# Patient Record
Sex: MALE | Race: OTHER | Employment: OTHER | URBAN - NONMETROPOLITAN AREA
[De-identification: names, ages, dates, MRNs, and addresses within clinical notes are randomized per-mention and may not be internally consistent; named-entity substitution may affect disease eponyms.]

---

## 2018-10-13 ENCOUNTER — HOSPITAL ENCOUNTER (EMERGENCY)
Age: 68
Discharge: HOME | End: 2018-10-13
Attending: FAMILY MEDICINE
Payer: MEDICARE

## 2018-10-13 VITALS
SYSTOLIC BLOOD PRESSURE: 164 MMHG | WEIGHT: 180 LBS | TEMPERATURE: 97.8 F | RESPIRATION RATE: 16 BRPM | DIASTOLIC BLOOD PRESSURE: 78 MMHG | HEIGHT: 65 IN | OXYGEN SATURATION: 98 % | HEART RATE: 98 BPM | BODY MASS INDEX: 29.99 KG/M2

## 2018-10-13 DIAGNOSIS — R33.8 POSTOPERATIVE URINARY RETENTION: Primary | ICD-10-CM

## 2018-10-13 DIAGNOSIS — N99.89 POSTOPERATIVE URINARY RETENTION: Primary | ICD-10-CM

## 2018-10-13 LAB
BACTERIA: NEGATIVE /HPF
BILIRUBIN URINE: NEGATIVE
BLOOD, URINE: ABNORMAL
CLARITY: ABNORMAL
COLOR: YELLOW
EPITHELIAL CELLS, UA: 0 /HPF (ref 0–5)
GLUCOSE URINE: 500 MG/DL
HYALINE CASTS: 6 /HPF (ref 0–8)
KETONES, URINE: NEGATIVE MG/DL
LEUKOCYTE ESTERASE, URINE: ABNORMAL
NITRITE, URINE: NEGATIVE
PH UA: 5.5
PROTEIN UA: 100 MG/DL
RBC UA: 37 /HPF (ref 0–4)
SPECIFIC GRAVITY UA: 1.02
URINE REFLEX TO CULTURE: YES
UROBILINOGEN, URINE: 0.2 E.U./DL
WBC UA: 11 /HPF (ref 0–5)

## 2018-10-13 PROCEDURE — 87086 URINE CULTURE/COLONY COUNT: CPT

## 2018-10-13 PROCEDURE — 99284 EMERGENCY DEPT VISIT MOD MDM: CPT | Performed by: FAMILY MEDICINE

## 2018-10-13 PROCEDURE — 81001 URINALYSIS AUTO W/SCOPE: CPT

## 2018-10-13 PROCEDURE — 99283 EMERGENCY DEPT VISIT LOW MDM: CPT

## 2018-10-13 PROCEDURE — 6370000000 HC RX 637 (ALT 250 FOR IP): Performed by: FAMILY MEDICINE

## 2018-10-13 RX ORDER — UBIDECARENONE 60 MG
30 CAPSULE ORAL
COMMUNITY

## 2018-10-13 RX ORDER — DOXAZOSIN 2 MG/1
2 TABLET ORAL 2 TIMES DAILY
COMMUNITY

## 2018-10-13 RX ORDER — MAG HYDROX/ALUMINUM HYD/SIMETH 400-400-40
SUSPENSION, ORAL (FINAL DOSE FORM) ORAL
COMMUNITY

## 2018-10-13 RX ORDER — GLIMEPIRIDE 4 MG/1
4 TABLET ORAL 2 TIMES DAILY
COMMUNITY

## 2018-10-13 RX ORDER — LISINOPRIL 20 MG/1
20 TABLET ORAL 2 TIMES DAILY
COMMUNITY

## 2018-10-13 RX ORDER — OXYCODONE HYDROCHLORIDE AND ACETAMINOPHEN 5; 325 MG/1; MG/1
1 TABLET ORAL EVERY 6 HOURS PRN
COMMUNITY

## 2018-10-13 RX ADMIN — LIDOCAINE HYDROCHLORIDE: 20 JELLY TOPICAL at 04:10

## 2018-10-13 ASSESSMENT — ENCOUNTER SYMPTOMS
COLOR CHANGE: 0
WHEEZING: 0
DIARRHEA: 0
NAUSEA: 0
VOMITING: 0
COUGH: 0
ABDOMINAL PAIN: 0
SHORTNESS OF BREATH: 0
BACK PAIN: 0
SORE THROAT: 0

## 2018-10-13 NOTE — ED NOTES
PT has healing surgical scars and bruising over abd from recent laproscopic surgery. No swelling, drainage or signs of infection noted.       Tima Sterling RN  10/13/18 [id number]

## 2018-10-13 NOTE — ED PROVIDER NOTES
and oriented to person, place, and time. Psychiatric: He has a normal mood and affect. DIAGNOSTIC RESULTS     EKG: All EKG's areinterpreted by the Emergency Department Physician who either signs or Co-signs this chart in the absence of a cardiologist.        RADIOLOGY:  Non-plain film images such as CT, Ultrasound and MRI are read by the radiologist. Plain radiographic images are visualized and preliminarily interpreted bythe emergency physician with the below findings:          No orders to display           LABS:  Labs Reviewed   URINE RT REFLEX TO CULTURE       All other labs were within normal range or not returned as of this dictation. EMERGENCY DEPARTMENT COURSE and DIFFERENTIAL DIAGNOSIS/MDM:   Vitals:    Vitals:    10/13/18 0330   BP: (!) 189/102   Pulse: 109   Resp: 18   Temp: 97.7 °F (36.5 °C)   TempSrc: Oral   SpO2: 97%   Weight: 180 lb (81.6 kg)   Height: 5' 5\" (1.651 m)       MDM    Reassessment  Clifford in place 4:17 a.m. states he feels better we will discharge to the house follow-up with his urologist    CONSULTS:  None    PROCEDURES:  Unless otherwise noted below, none     Procedures    FINAL IMPRESSION      1. Postoperative urinary retention          DISPOSITION/PLAN   DISPOSITION Decision To Discharge 10/13/2018 04:17:01 AM      PATIENT REFERRED TO:  No follow-up provider specified.     DISCHARGE MEDICATIONS:  Current Discharge Medication List             (Please note that portions of this note were completed with a voice recognition program.  Efforts were made to edit thedictations but occasionally words are mis-transcribed.)    Bella Gupta MD (electronically signed)  Attending Emergency Physician          Carol Barber MD  10/13/18 [id number]

## 2018-10-15 LAB — URINE CULTURE, ROUTINE: NORMAL

## 2018-10-20 ENCOUNTER — HOSPITAL ENCOUNTER (EMERGENCY)
Age: 68
Discharge: HOME | End: 2018-10-20
Payer: MEDICARE

## 2018-10-20 VITALS
DIASTOLIC BLOOD PRESSURE: 102 MMHG | TEMPERATURE: 98.2 F | BODY MASS INDEX: 29.99 KG/M2 | SYSTOLIC BLOOD PRESSURE: 182 MMHG | HEART RATE: 109 BPM | HEIGHT: 65 IN | RESPIRATION RATE: 16 BRPM | OXYGEN SATURATION: 95 % | WEIGHT: 180 LBS

## 2018-10-20 DIAGNOSIS — R33.8 POSTOPERATIVE URINARY RETENTION: Primary | ICD-10-CM

## 2018-10-20 DIAGNOSIS — N99.89 POSTOPERATIVE URINARY RETENTION: Primary | ICD-10-CM

## 2018-10-20 LAB
BACTERIA: NEGATIVE /HPF
BILIRUBIN URINE: NEGATIVE
BLOOD, URINE: ABNORMAL
CLARITY: CLEAR
COLOR: YELLOW
EPITHELIAL CELLS, UA: 0 /HPF (ref 0–5)
GLUCOSE URINE: NEGATIVE MG/DL
HYALINE CASTS: 0 /HPF (ref 0–8)
KETONES, URINE: NEGATIVE MG/DL
LEUKOCYTE ESTERASE, URINE: ABNORMAL
NITRITE, URINE: NEGATIVE
PH UA: 6
PROTEIN UA: ABNORMAL MG/DL
RBC UA: 28 /HPF (ref 0–4)
SPECIFIC GRAVITY UA: 1.01
URINE REFLEX TO CULTURE: YES
UROBILINOGEN, URINE: 0.2 E.U./DL
WBC UA: 3 /HPF (ref 0–5)

## 2018-10-20 PROCEDURE — 6370000000 HC RX 637 (ALT 250 FOR IP): Performed by: NURSE PRACTITIONER

## 2018-10-20 PROCEDURE — 81001 URINALYSIS AUTO W/SCOPE: CPT

## 2018-10-20 PROCEDURE — 99283 EMERGENCY DEPT VISIT LOW MDM: CPT

## 2018-10-20 PROCEDURE — 99284 EMERGENCY DEPT VISIT MOD MDM: CPT | Performed by: NURSE PRACTITIONER

## 2018-10-20 PROCEDURE — 87086 URINE CULTURE/COLONY COUNT: CPT

## 2018-10-20 PROCEDURE — 51702 INSERT TEMP BLADDER CATH: CPT

## 2018-10-20 RX ADMIN — LIDOCAINE HYDROCHLORIDE: 20 JELLY TOPICAL at 09:20

## 2018-10-20 ASSESSMENT — ENCOUNTER SYMPTOMS: ABDOMINAL PAIN: 1

## 2018-10-22 LAB — URINE CULTURE, ROUTINE: NORMAL

## 2023-04-30 ENCOUNTER — HOSPITAL ENCOUNTER (EMERGENCY)
Age: 73
Discharge: HOME | End: 2023-04-30
Payer: MEDICARE

## 2023-04-30 VITALS
BODY MASS INDEX: 26.49 KG/M2 | HEIGHT: 65 IN | WEIGHT: 159 LBS | RESPIRATION RATE: 17 BRPM | OXYGEN SATURATION: 98 % | SYSTOLIC BLOOD PRESSURE: 157 MMHG | HEART RATE: 71 BPM | TEMPERATURE: 98.3 F | DIASTOLIC BLOOD PRESSURE: 72 MMHG

## 2023-04-30 DIAGNOSIS — S05.01XA ABRASION OF RIGHT CORNEA, INITIAL ENCOUNTER: Primary | ICD-10-CM

## 2023-04-30 PROCEDURE — 6370000000 HC RX 637 (ALT 250 FOR IP): Performed by: NURSE PRACTITIONER

## 2023-04-30 PROCEDURE — 99283 EMERGENCY DEPT VISIT LOW MDM: CPT

## 2023-04-30 RX ORDER — ERYTHROMYCIN 5 MG/G
OINTMENT OPHTHALMIC ONCE
Status: COMPLETED | OUTPATIENT
Start: 2023-04-30 | End: 2023-04-30

## 2023-04-30 RX ORDER — TETRACAINE HYDROCHLORIDE 5 MG/ML
1 SOLUTION OPHTHALMIC ONCE
Status: COMPLETED | OUTPATIENT
Start: 2023-04-30 | End: 2023-04-30

## 2023-04-30 RX ADMIN — ERYTHROMYCIN: 5 OINTMENT OPHTHALMIC at 21:34

## 2023-04-30 RX ADMIN — FLUORESCEIN SODIUM 1 MG: 1 STRIP OPHTHALMIC at 20:46

## 2023-04-30 RX ADMIN — TETRACAINE HYDROCHLORIDE 1 DROP: 5 SOLUTION OPHTHALMIC at 20:46

## 2023-04-30 ASSESSMENT — PAIN DESCRIPTION - PAIN TYPE: TYPE: ACUTE PAIN

## 2023-04-30 ASSESSMENT — PAIN DESCRIPTION - LOCATION: LOCATION: EYE

## 2023-04-30 ASSESSMENT — ENCOUNTER SYMPTOMS
PHOTOPHOBIA: 1
EYE WATERING: 1
BLURRED VISION: 0
EYE PAIN: 1
EYE DISCHARGE: 1
EYE REDNESS: 1

## 2023-04-30 ASSESSMENT — VISUAL ACUITY: OD: 20/10

## 2023-04-30 ASSESSMENT — PAIN DESCRIPTION - ORIENTATION: ORIENTATION: RIGHT

## 2023-04-30 ASSESSMENT — PAIN SCALES - GENERAL: PAINLEVEL_OUTOF10: 3

## 2023-04-30 ASSESSMENT — PAIN DESCRIPTION - FREQUENCY: FREQUENCY: CONTINUOUS

## 2023-04-30 ASSESSMENT — PAIN DESCRIPTION - DESCRIPTORS: DESCRIPTORS: BURNING

## 2023-04-30 ASSESSMENT — PAIN - FUNCTIONAL ASSESSMENT: PAIN_FUNCTIONAL_ASSESSMENT: 0-10

## 2023-05-01 NOTE — ED PROVIDER NOTES
[facility]  eMERGENCY dEPARTMENT eNCOUnter      Pt Name: Bobby Ernst  MRN: [id number]  Velasquezgferika 1950  Date of evaluation: 4/30/2023  Provider: [address]       Chief Complaint   Patient presents with    Eye Pain     Pt reports feeling like something is in eye for past 3 days. No visible foreign object, altho right eye is tearing. HISTORY OF PRESENT ILLNESS   (Location/Symptom, Timing/Onset,Context/Setting, Quality, Duration, Modifying Factors, Severity)  Note limiting factors. Bobby Ernst is a 81 y.o. male who presents to the emergency department with something in his r eye. He was blowing grass with a blower and got something in it. Does not wear contacts. Has had no vision changes    The history is provided by the patient. Eye Problem  Location:  Right eye  Quality:  Aching, foreign body sensation and tearing  Severity:  Moderate  Onset quality:  Sudden  Duration:  3 days  Timing:  Constant  Progression:  Worsening  Chronicity:  New  Context: foreign body    Associated symptoms: discharge, photophobia, redness and tearing    Associated symptoms: no blurred vision      NursingNotes were reviewed. REVIEW OF SYSTEMS    (2-9 systems for level 4, 10 or more for level 5)     Review of Systems   Eyes:  Positive for photophobia, pain, discharge and redness. Negative for blurred vision. Except as noted above the remainder of the review of systems was reviewed and negative.        PAST MEDICAL HISTORY     Past Medical History:   Diagnosis Date    Cancer [facility])     prostate    Diabetes mellitus ([facility] [address]          SURGICALHISTORY       Past Surgical History:   Procedure Laterality Date    APPENDECTOMY      KNEE SURGERY Right     PROSTATE SURGERY  10/03/2018    TONSILLECTOMY AND ADENOIDECTOMY           CURRENT MEDICATIONS       Discharge Medication List as of 4/30/2023  9:50 PM        CONTINUE these medications which have NOT CHANGED    Details   oxyCODONE-acetaminophen

## 2023-12-15 ENCOUNTER — TELEPHONE (OUTPATIENT)
Dept: HEMATOLOGY | Age: 73
End: 2023-12-15

## 2023-12-15 NOTE — TELEPHONE ENCOUNTER
Called patient to remind them of their appointment on 12/18/23 but was unable to leave vm due to mailbox being full or not set up or memory is full.

## 2023-12-18 ENCOUNTER — HOSPITAL ENCOUNTER (OUTPATIENT)
Dept: INFUSION THERAPY | Age: 73
Discharge: HOME | End: 2023-12-18
Payer: MEDICARE

## 2023-12-18 DIAGNOSIS — C61 PROSTATE CANCER (HCC): ICD-10-CM

## 2023-12-18 LAB
ALBUMIN SERPL-MCNC: 4.3 G/DL (ref 3.5–5.2)
ALP SERPL-CCNC: 106 U/L (ref 40–130)
ALT SERPL-CCNC: 29 U/L (ref 21–72)
ANION GAP SERPL CALCULATED.3IONS-SCNC: 13 MMOL/L (ref 7–19)
AST SERPL-CCNC: 27 U/L (ref 17–59)
BASOPHILS # BLD: 0.03 K/UL (ref 0.01–0.08)
BASOPHILS NFR BLD: 0.4 % (ref 0.1–1.2)
BILIRUB SERPL-MCNC: 0.6 MG/DL (ref 0.2–1.3)
BUN SERPL-MCNC: 14 MG/DL (ref 9–20)
CALCIUM SERPL-MCNC: 9.6 MG/DL (ref 8.4–10.2)
CHLORIDE SERPL-SCNC: 101 MMOL/L (ref 98–111)
CO2 SERPL-SCNC: 24 MMOL/L (ref 22–29)
CREAT SERPL-MCNC: 0.8 MG/DL (ref 0.6–1.2)
EOSINOPHIL # BLD: 0.3 K/UL (ref 0.04–0.54)
EOSINOPHIL NFR BLD: 4 % (ref 0.7–7)
ERYTHROCYTE [DISTWIDTH] IN BLOOD BY AUTOMATED COUNT: 13 % (ref 11.6–14.4)
GLOBULIN: 3 G/DL
GLUCOSE SERPL-MCNC: 265 MG/DL (ref 74–106)
HCT VFR BLD AUTO: 42.2 % (ref 40.1–51)
HGB BLD-MCNC: 15.1 G/DL (ref 13.7–17.5)
LYMPHOCYTES # BLD: 1.36 K/UL (ref 1.18–3.74)
LYMPHOCYTES NFR BLD: 18.1 % (ref 19.3–53.1)
MCH RBC QN AUTO: 30 PG (ref 25.7–32.2)
MCHC RBC AUTO-ENTMCNC: 35.8 G/DL (ref 32.3–36.5)
MCV RBC AUTO: 83.9 FL (ref 79–92.2)
MONOCYTES # BLD: 0.52 K/UL (ref 0.24–0.82)
MONOCYTES NFR BLD: 6.9 % (ref 4.7–12.5)
NEUTROPHILS # BLD: 5.3 K/UL (ref 1.56–6.13)
NEUTS SEG NFR BLD: 70.3 % (ref 34–71.1)
PLATELET # BLD AUTO: 191 K/UL (ref 163–337)
PMV BLD AUTO: 9.6 FL (ref 7.4–10.4)
POTASSIUM SERPL-SCNC: 4.6 MMOL/L (ref 3.5–5.1)
PROT SERPL-MCNC: 7.3 G/DL (ref 6.3–8.2)
PSA SERPL-MCNC: <0.01 NG/ML (ref 0–4)
RBC # BLD AUTO: 5.03 M/UL (ref 4.63–6.08)
SODIUM SERPL-SCNC: 138 MMOL/L (ref 137–145)
WBC # BLD AUTO: 7.53 K/UL (ref 4.23–9.07)

## 2023-12-18 PROCEDURE — 80053 COMPREHEN METABOLIC PANEL: CPT

## 2023-12-18 PROCEDURE — 36415 COLL VENOUS BLD VENIPUNCTURE: CPT

## 2023-12-18 PROCEDURE — 99212 OFFICE O/P EST SF 10 MIN: CPT

## 2023-12-18 PROCEDURE — 85025 COMPLETE CBC W/AUTO DIFF WBC: CPT

## 2023-12-20 PROBLEM — C61 PROSTATE CANCER (HCC): Status: ACTIVE | Noted: 2023-12-20

## 2023-12-28 ENCOUNTER — TELEPHONE (OUTPATIENT)
Dept: HEMATOLOGY | Age: 73
End: 2023-12-28

## 2024-01-02 ENCOUNTER — CLINICAL DOCUMENTATION (OUTPATIENT)
Facility: HOSPITAL | Age: 74
End: 2024-01-02

## 2024-01-02 NOTE — PROGRESS NOTES
Reviewed pt's chart and there isn't assistance available right now for pt's treatment as of today. I am mailing out pt's health benefits with a financial assistance application with my contact information for any financial or billing concerns.

## 2024-01-10 ENCOUNTER — HOSPITAL ENCOUNTER (OUTPATIENT)
Dept: INFUSION THERAPY | Age: 74
Discharge: HOME | End: 2024-01-10
Payer: MEDICARE

## 2024-01-10 VITALS
TEMPERATURE: 97.3 F | SYSTOLIC BLOOD PRESSURE: 132 MMHG | BODY MASS INDEX: 25.33 KG/M2 | HEIGHT: 65 IN | OXYGEN SATURATION: 96 % | WEIGHT: 152 LBS | HEART RATE: 75 BPM | DIASTOLIC BLOOD PRESSURE: 74 MMHG

## 2024-01-10 DIAGNOSIS — C61 PROSTATE CANCER (HCC): Primary | ICD-10-CM

## 2024-01-10 LAB
ALBUMIN SERPL-MCNC: 4.3 G/DL (ref 3.5–5.2)
ALP SERPL-CCNC: 157 U/L (ref 40–130)
ALT SERPL-CCNC: 31 U/L (ref 21–72)
ANION GAP SERPL CALCULATED.3IONS-SCNC: 13 MMOL/L (ref 7–19)
AST SERPL-CCNC: 20 U/L (ref 17–59)
BASOPHILS # BLD: 0.04 K/UL (ref 0.01–0.08)
BASOPHILS NFR BLD: 0.6 % (ref 0.1–1.2)
BILIRUB SERPL-MCNC: 0.4 MG/DL (ref 0.2–1.3)
BUN SERPL-MCNC: 17 MG/DL (ref 9–20)
CALCIUM SERPL-MCNC: 9.8 MG/DL (ref 8.4–10.2)
CHLORIDE SERPL-SCNC: 98 MMOL/L (ref 98–111)
CO2 SERPL-SCNC: 26 MMOL/L (ref 22–29)
CREAT SERPL-MCNC: 0.8 MG/DL (ref 0.6–1.2)
EOSINOPHIL # BLD: 0.27 K/UL (ref 0.04–0.54)
EOSINOPHIL NFR BLD: 3.9 % (ref 0.7–7)
ERYTHROCYTE [DISTWIDTH] IN BLOOD BY AUTOMATED COUNT: 13 % (ref 11.6–14.4)
GLOBULIN: 3 G/DL
GLUCOSE SERPL-MCNC: 389 MG/DL (ref 74–106)
HCT VFR BLD AUTO: 42.2 % (ref 40.1–51)
HGB BLD-MCNC: 14.9 G/DL (ref 13.7–17.5)
LYMPHOCYTES # BLD: 1.24 K/UL (ref 1.18–3.74)
LYMPHOCYTES NFR BLD: 17.9 % (ref 19.3–53.1)
MCH RBC QN AUTO: 30 PG (ref 25.7–32.2)
MCHC RBC AUTO-ENTMCNC: 35.3 G/DL (ref 32.3–36.5)
MCV RBC AUTO: 84.9 FL (ref 79–92.2)
MONOCYTES # BLD: 0.45 K/UL (ref 0.24–0.82)
MONOCYTES NFR BLD: 6.5 % (ref 4.7–12.5)
NEUTROPHILS # BLD: 4.91 K/UL (ref 1.56–6.13)
NEUTS SEG NFR BLD: 70.8 % (ref 34–71.1)
PLATELET # BLD AUTO: 197 K/UL (ref 163–337)
PMV BLD AUTO: 9.7 FL (ref 7.4–10.4)
POTASSIUM SERPL-SCNC: 4.9 MMOL/L (ref 3.5–5.1)
PROT SERPL-MCNC: 7.3 G/DL (ref 6.3–8.2)
RBC # BLD AUTO: 4.97 M/UL (ref 4.63–6.08)
SODIUM SERPL-SCNC: 137 MMOL/L (ref 137–145)
WBC # BLD AUTO: 6.93 K/UL (ref 4.23–9.07)

## 2024-01-10 PROCEDURE — 6360000002 HC RX W HCPCS: Performed by: INTERNAL MEDICINE

## 2024-01-10 PROCEDURE — 36415 COLL VENOUS BLD VENIPUNCTURE: CPT

## 2024-01-10 PROCEDURE — 80053 COMPREHEN METABOLIC PANEL: CPT

## 2024-01-10 PROCEDURE — 85025 COMPLETE CBC W/AUTO DIFF WBC: CPT

## 2024-01-10 PROCEDURE — 96402 CHEMO HORMON ANTINEOPL SQ/IM: CPT

## 2024-01-10 RX ORDER — SODIUM CHLORIDE 9 MG/ML
INJECTION, SOLUTION INTRAVENOUS CONTINUOUS
OUTPATIENT
Start: 2024-04-03

## 2024-01-10 RX ORDER — ONDANSETRON 2 MG/ML
8 INJECTION INTRAMUSCULAR; INTRAVENOUS
OUTPATIENT
Start: 2024-04-03

## 2024-01-10 RX ORDER — EPINEPHRINE 1 MG/ML
0.3 INJECTION, SOLUTION, CONCENTRATE INTRAVENOUS PRN
OUTPATIENT
Start: 2024-04-03

## 2024-01-10 RX ORDER — DIPHENHYDRAMINE HYDROCHLORIDE 50 MG/ML
50 INJECTION INTRAMUSCULAR; INTRAVENOUS
OUTPATIENT
Start: 2024-04-03

## 2024-01-10 RX ORDER — ALBUTEROL SULFATE 90 UG/1
4 AEROSOL, METERED RESPIRATORY (INHALATION) PRN
OUTPATIENT
Start: 2024-04-03

## 2024-01-10 RX ORDER — FAMOTIDINE 10 MG/ML
20 INJECTION, SOLUTION INTRAVENOUS
OUTPATIENT
Start: 2024-04-03

## 2024-01-10 RX ORDER — ACETAMINOPHEN 325 MG/1
650 TABLET ORAL
OUTPATIENT
Start: 2024-04-03

## 2024-01-10 RX ADMIN — LEUPROLIDE ACETATE 22.5 MG: KIT at 14:39

## 2024-04-02 ENCOUNTER — TELEPHONE (OUTPATIENT)
Dept: HEMATOLOGY | Age: 74
End: 2024-04-02

## 2024-04-03 ENCOUNTER — HOSPITAL ENCOUNTER (OUTPATIENT)
Dept: INFUSION THERAPY | Age: 74
Discharge: HOME | End: 2024-04-03

## 2024-08-06 ENCOUNTER — OFFICE VISIT (OUTPATIENT)
Dept: FAMILY MEDICINE CLINIC | Facility: CLINIC | Age: 74
End: 2024-08-06
Payer: MEDICARE

## 2024-08-06 ENCOUNTER — PATIENT ROUNDING (BHMG ONLY) (OUTPATIENT)
Dept: FAMILY MEDICINE CLINIC | Facility: CLINIC | Age: 74
End: 2024-08-06
Payer: MEDICARE

## 2024-08-06 VITALS
HEART RATE: 90 BPM | SYSTOLIC BLOOD PRESSURE: 147 MMHG | WEIGHT: 151 LBS | OXYGEN SATURATION: 97 % | TEMPERATURE: 97.1 F | BODY MASS INDEX: 26.75 KG/M2 | DIASTOLIC BLOOD PRESSURE: 82 MMHG | HEIGHT: 63 IN

## 2024-08-06 DIAGNOSIS — E78.1 HYPERTRIGLYCERIDEMIA: Chronic | ICD-10-CM

## 2024-08-06 DIAGNOSIS — E11.65 TYPE 2 DIABETES MELLITUS WITH HYPERGLYCEMIA, WITHOUT LONG-TERM CURRENT USE OF INSULIN: Primary | Chronic | ICD-10-CM

## 2024-08-06 DIAGNOSIS — I10 PRIMARY HYPERTENSION: Chronic | ICD-10-CM

## 2024-08-06 DIAGNOSIS — J30.2 SEASONAL ALLERGIES: ICD-10-CM

## 2024-08-06 DIAGNOSIS — J44.9 CHRONIC OBSTRUCTIVE PULMONARY DISEASE, UNSPECIFIED COPD TYPE: ICD-10-CM

## 2024-08-06 DIAGNOSIS — R05.1 ACUTE COUGH: ICD-10-CM

## 2024-08-06 PROBLEM — E83.42 HYPOMAGNESEMIA: Status: ACTIVE | Noted: 2019-02-12

## 2024-08-06 PROBLEM — C61 MALIGNANT NEOPLASM OF PROSTATE: Status: ACTIVE | Noted: 2018-08-10

## 2024-08-06 PROCEDURE — 99204 OFFICE O/P NEW MOD 45 MIN: CPT | Performed by: NURSE PRACTITIONER

## 2024-08-06 PROCEDURE — 3077F SYST BP >= 140 MM HG: CPT | Performed by: NURSE PRACTITIONER

## 2024-08-06 PROCEDURE — 1159F MED LIST DOCD IN RCRD: CPT | Performed by: NURSE PRACTITIONER

## 2024-08-06 PROCEDURE — 3079F DIAST BP 80-89 MM HG: CPT | Performed by: NURSE PRACTITIONER

## 2024-08-06 PROCEDURE — 1160F RVW MEDS BY RX/DR IN RCRD: CPT | Performed by: NURSE PRACTITIONER

## 2024-08-06 PROCEDURE — 3046F HEMOGLOBIN A1C LEVEL >9.0%: CPT | Performed by: NURSE PRACTITIONER

## 2024-08-06 RX ORDER — ATORVASTATIN CALCIUM 80 MG/1
1 TABLET, FILM COATED ORAL DAILY
COMMUNITY
End: 2024-08-08

## 2024-08-06 RX ORDER — FLUTICASONE PROPIONATE 50 MCG
2 SPRAY, SUSPENSION (ML) NASAL DAILY
Qty: 18.2 ML | Refills: 5 | Status: SHIPPED | OUTPATIENT
Start: 2024-08-06 | End: 2024-09-05

## 2024-08-06 RX ORDER — LANCETS
EACH MISCELLANEOUS
Qty: 90 EACH | Refills: 12 | Status: SHIPPED | OUTPATIENT
Start: 2024-08-06

## 2024-08-06 RX ORDER — CARVEDILOL 25 MG/1
1 TABLET ORAL 2 TIMES DAILY
COMMUNITY
Start: 2024-05-01

## 2024-08-06 RX ORDER — LOPERAMIDE HYDROCHLORIDE 2 MG/1
1 CAPSULE ORAL 2 TIMES DAILY
COMMUNITY

## 2024-08-06 RX ORDER — ELECTROLYTES/DEXTROSE
SOLUTION, ORAL ORAL
COMMUNITY

## 2024-08-06 NOTE — PROGRESS NOTES
"August 6, 2024    Hello, may I speak with William Mclean?    My name is JULISSA    I am  with [facility]  [address] [id number] [phone].    Before we get started may I verify your date of birth? 1950    I am calling to officially welcome you to our practice and ask about your recent visit. Is this a good time to talk? yes    Tell me about your visit with us. What things went well?  \"everything went extremely well\"        We're always looking for ways to make our patients' experiences even better. Do you have recommendations on ways we may improve?  \"not at all. Everything was just perfect\"    Overall were you satisfied with your first visit to our practice? yes       I appreciate you taking the time to speak with me today. Is there anything else I can do for you? no      Thank you, and have a great day.      "

## 2024-08-06 NOTE — PROGRESS NOTES
"Chief Complaint   Patient presents with    Establish Care     New pt here to establish care in this office. Pt would like to discuss dizziness, stomach issues and discuss diabetic supplies.         Subjective   William Mclean is a 73 y.o. male who presents today for the following     Cough  Chronicity: 2 months. Associated symptoms include postnasal drip. Pertinent negatives include no fever.   Diabetes  He presents for his follow-up diabetic visit. He has type 2 diabetes mellitus. Risk factors for coronary artery disease include male sex, hypertension and dyslipidemia. Current diabetic treatment includes oral agent (dual therapy). Home blood sugar record trend: Patient does not check blood glucose.   Additional comments: Patient is unsure of last A1C.   Hypertension  This is a chronic problem. The problem has been stable since onset. The problem is controlled. Current antihypertension treatment includes beta blockers.   Hyperlipidemia  This is a chronic problem. Current antihyperlipidemic treatment includes statins. Compliance problems include adherence to diet.  Risk factors for coronary artery disease include diabetes mellitus, dyslipidemia, hypertension and male sex.          No Known Allergies      OBJECTIVE:  Vitals:    08/06/24 1310   BP: 147/82   BP Location: Left arm   Patient Position: Sitting   Cuff Size: Adult   Pulse: 90   Temp: 97.1 °F (36.2 °C)   TempSrc: Infrared   SpO2: 97%   Weight: 68.5 kg (151 lb)   Height: 160 cm (63\")     Physical Exam  Vitals and nursing note reviewed.   Constitutional:       Appearance: Normal appearance.   HENT:      Head: Normocephalic and atraumatic.      Right Ear: Tympanic membrane is not erythematous.      Left Ear: Tympanic membrane is not erythematous.      Ears:      Comments: Bilateral tms cloudy fluid noted.      Nose: Nose normal.   Eyes:      Pupils: Pupils are equal, round, and reactive to light.   Cardiovascular:      Rate and Rhythm: Normal rate and regular " rhythm.   Pulmonary:      Breath sounds: Wheezing (upper and lower lobes.) present.   Abdominal:      General: Bowel sounds are normal.   Musculoskeletal:         General: Normal range of motion.   Skin:     General: Skin is warm and dry.   Neurological:      Mental Status: He is alert and oriented to person, place, and time.   Psychiatric:         Mood and Affect: Mood normal.         Behavior: Behavior normal.         Thought Content: Thought content normal.         Judgment: Judgment normal.         BMI is >= 25 and <30. (Overweight) The following options were offered after discussion;: exercise counseling/recommendations and nutrition counseling/recommendations          ASSESSMENT/ PLAN:    Diagnoses and all orders for this visit:    1. Type 2 diabetes mellitus with hyperglycemia, without long-term current use of insulin (Primary)  -     CBC & Differential  -     Cancel: Comprehensive Metabolic Panel  -     TSH  -     Lipid Panel With LDL / HDL Ratio  -     Hemoglobin A1c  -     Comprehensive Metabolic Panel  -     Blood Glucose Monitor Software device; Use 1 strip 3 (Three) Times a Day for 30 days.  Dispense: 1 each; Refill: 0  -     glucose blood test strip; Check blood glucose three times a day.  Dispense: 90 each; Refill: 12  -     Lancets (onetouch ultrasoft) lancets; Check blood glucose three times a day.  Dispense: 90 each; Refill: 12    2. Seasonal allergies  -     fluticasone (FLONASE) 50 MCG/ACT nasal spray; 2 sprays into the nostril(s) as directed by provider Daily for 30 days.  Dispense: 18.2 mL; Refill: 5    3. Hypertriglyceridemia  -     CBC & Differential  -     TSH  -     Lipid Panel With LDL / HDL Ratio    4. Primary hypertension  -     CBC & Differential  -     Cancel: Comprehensive Metabolic Panel  -     TSH  -     Lipid Panel With LDL / HDL Ratio  -     Comprehensive Metabolic Panel    5. Acute cough    6. Chronic obstructive pulmonary disease, unspecified COPD type  -     albuterol sulfate   (90 Base) MCG/ACT inhaler; Inhale 2 puffs Every 4 (Four) to 6 (Six) Hours As Needed for Wheezing or Shortness of Air (or persistant cough.).  Dispense: 18 g; Refill: 0      Procedures       Follow-up: Return in about 1 week (around 8/13/2024) for follow up on labs.      Maddy Murphy, LEANDRA 8/7/2024 12:29 CDT  This note was electronically signed.

## 2024-08-07 ENCOUNTER — TELEPHONE (OUTPATIENT)
Dept: FAMILY MEDICINE CLINIC | Facility: CLINIC | Age: 74
End: 2024-08-07
Payer: MEDICARE

## 2024-08-07 LAB
ALBUMIN SERPL-MCNC: 4.3 G/DL (ref 3.8–4.8)
ALP SERPL-CCNC: 151 IU/L (ref 44–121)
ALT SERPL-CCNC: 26 IU/L (ref 0–44)
AST SERPL-CCNC: 16 IU/L (ref 0–40)
BASOPHILS # BLD AUTO: 0.1 X10E3/UL (ref 0–0.2)
BASOPHILS NFR BLD AUTO: 1 %
BILIRUB SERPL-MCNC: 0.5 MG/DL (ref 0–1.2)
BUN SERPL-MCNC: 15 MG/DL (ref 8–27)
BUN/CREAT SERPL: 14 (ref 10–24)
CALCIUM SERPL-MCNC: 9.7 MG/DL (ref 8.6–10.2)
CHLORIDE SERPL-SCNC: 97 MMOL/L (ref 96–106)
CHOLEST SERPL-MCNC: 171 MG/DL (ref 100–199)
CO2 SERPL-SCNC: 20 MMOL/L (ref 20–29)
CREAT SERPL-MCNC: 1.06 MG/DL (ref 0.76–1.27)
EGFRCR SERPLBLD CKD-EPI 2021: 75 ML/MIN/1.73
EOSINOPHIL # BLD AUTO: 0.4 X10E3/UL (ref 0–0.4)
EOSINOPHIL NFR BLD AUTO: 4 %
ERYTHROCYTE [DISTWIDTH] IN BLOOD BY AUTOMATED COUNT: 13 % (ref 11.6–15.4)
GLOBULIN SER CALC-MCNC: 2.7 G/DL (ref 1.5–4.5)
GLUCOSE SERPL-MCNC: 330 MG/DL (ref 70–99)
HBA1C MFR BLD: 10.4 % (ref 4.8–5.6)
HCT VFR BLD AUTO: 48.4 % (ref 37.5–51)
HDLC SERPL-MCNC: 32 MG/DL
HGB BLD-MCNC: 16.1 G/DL (ref 13–17.7)
IMM GRANULOCYTES # BLD AUTO: 0 X10E3/UL (ref 0–0.1)
IMM GRANULOCYTES NFR BLD AUTO: 0 %
LDLC SERPL CALC-MCNC: 55 MG/DL (ref 0–99)
LDLC/HDLC SERPL: 1.7 RATIO (ref 0–3.6)
LYMPHOCYTES # BLD AUTO: 1.2 X10E3/UL (ref 0.7–3.1)
LYMPHOCYTES NFR BLD AUTO: 14 %
MCH RBC QN AUTO: 29.2 PG (ref 26.6–33)
MCHC RBC AUTO-ENTMCNC: 33.3 G/DL (ref 31.5–35.7)
MCV RBC AUTO: 88 FL (ref 79–97)
MONOCYTES # BLD AUTO: 0.5 X10E3/UL (ref 0.1–0.9)
MONOCYTES NFR BLD AUTO: 6 %
NEUTROPHILS # BLD AUTO: 6.8 X10E3/UL (ref 1.4–7)
NEUTROPHILS NFR BLD AUTO: 75 %
PLATELET # BLD AUTO: 224 X10E3/UL (ref 150–450)
POTASSIUM SERPL-SCNC: 4.7 MMOL/L (ref 3.5–5.2)
PROT SERPL-MCNC: 7 G/DL (ref 6–8.5)
RBC # BLD AUTO: 5.51 X10E6/UL (ref 4.14–5.8)
SODIUM SERPL-SCNC: 134 MMOL/L (ref 134–144)
TRIGL SERPL-MCNC: 564 MG/DL (ref 0–149)
TSH SERPL DL<=0.005 MIU/L-ACNC: 1.81 UIU/ML (ref 0.45–4.5)
VLDLC SERPL CALC-MCNC: 84 MG/DL (ref 5–40)
WBC # BLD AUTO: 8.9 X10E3/UL (ref 3.4–10.8)

## 2024-08-07 RX ORDER — ALBUTEROL SULFATE 90 UG/1
2 AEROSOL, METERED RESPIRATORY (INHALATION)
Qty: 18 G | Refills: 0 | Status: SHIPPED | OUTPATIENT
Start: 2024-08-07

## 2024-08-07 NOTE — TELEPHONE ENCOUNTER
Contacted pt and explained need for an appointment to discuss lab results. Pt is added to scheduled tomorrow for 1PM.

## 2024-08-07 NOTE — TELEPHONE ENCOUNTER
Please call patient and tell him to make an appointment because  I want to talk to him about his labs. His A1C and Triglycerides are elevated.

## 2024-08-08 ENCOUNTER — OFFICE VISIT (OUTPATIENT)
Dept: FAMILY MEDICINE CLINIC | Facility: CLINIC | Age: 74
End: 2024-08-08
Payer: MEDICARE

## 2024-08-08 VITALS
HEIGHT: 63 IN | WEIGHT: 151 LBS | SYSTOLIC BLOOD PRESSURE: 140 MMHG | OXYGEN SATURATION: 94 % | TEMPERATURE: 97.2 F | DIASTOLIC BLOOD PRESSURE: 80 MMHG | BODY MASS INDEX: 26.75 KG/M2 | HEART RATE: 53 BPM

## 2024-08-08 DIAGNOSIS — E11.65 TYPE 2 DIABETES MELLITUS WITH HYPERGLYCEMIA, WITHOUT LONG-TERM CURRENT USE OF INSULIN: Chronic | ICD-10-CM

## 2024-08-08 DIAGNOSIS — E78.1 HYPERTRIGLYCERIDEMIA: Primary | Chronic | ICD-10-CM

## 2024-08-08 PROBLEM — Z95.5 S/P DRUG ELUTING CORONARY STENT PLACEMENT: Status: ACTIVE | Noted: 2019-02-14

## 2024-08-08 PROCEDURE — 3077F SYST BP >= 140 MM HG: CPT | Performed by: NURSE PRACTITIONER

## 2024-08-08 PROCEDURE — 3079F DIAST BP 80-89 MM HG: CPT | Performed by: NURSE PRACTITIONER

## 2024-08-08 PROCEDURE — 3046F HEMOGLOBIN A1C LEVEL >9.0%: CPT | Performed by: NURSE PRACTITIONER

## 2024-08-08 PROCEDURE — 99213 OFFICE O/P EST LOW 20 MIN: CPT | Performed by: NURSE PRACTITIONER

## 2024-08-08 RX ORDER — ROSUVASTATIN CALCIUM 40 MG/1
40 TABLET, COATED ORAL DAILY
Qty: 90 TABLET | Refills: 0 | Status: SHIPPED | OUTPATIENT
Start: 2024-08-08 | End: 2025-05-05

## 2024-08-08 RX ORDER — DIPHENHYDRAMINE HCL 25 MG
TABLET ORAL
COMMUNITY
Start: 2024-08-06

## 2024-08-08 NOTE — PROGRESS NOTES
"Chief Complaint   Patient presents with    Results        Subjective   William Mclean is a 73 y.o. male who presents today for the following     Diabetes  He presents for his follow-up diabetic visit. He has type 2 diabetes mellitus. His disease course has been worsening (A1C went from 7 to 10.4). Symptoms are worsening. Risk factors for coronary artery disease include dyslipidemia, hypertension and male sex. Current diabetic treatment includes oral agent (triple therapy). Diabetic current diet: Patient admits to eating wafers and drinking cokes. He never participates in exercise. Home blood sugar record trend: Patient does not check blood glucose at home.   Hyperlipidemia  This is a chronic problem. The problem is uncontrolled. Exacerbating diseases include diabetes. Current antihyperlipidemic treatment includes statins. The current treatment provides mild (Trigs still elevated.) improvement of lipids. Risk factors for coronary artery disease include diabetes mellitus, dyslipidemia, hypertension and male sex.          No Known Allergies      OBJECTIVE:  Vitals:    08/08/24 1309   BP: 140/80   BP Location: Left arm   Patient Position: Sitting   Cuff Size: Adult   Pulse: 53   Temp: 97.2 °F (36.2 °C)   TempSrc: Infrared   SpO2: 94%   Weight: 68.5 kg (151 lb)   Height: 160 cm (63\")     Physical Exam  Constitutional:       Appearance: Normal appearance.   HENT:      Head: Normocephalic and atraumatic.      Nose: Nose normal.   Eyes:      Pupils: Pupils are equal, round, and reactive to light.   Cardiovascular:      Rate and Rhythm: Normal rate and regular rhythm.   Pulmonary:      Breath sounds: Wheezing (posterior lower lobes.) present.   Abdominal:      General: Bowel sounds are normal.   Musculoskeletal:         General: Normal range of motion.   Skin:     General: Skin is warm and dry.   Neurological:      Mental Status: He is alert and oriented to person, place, and time.   Psychiatric:         Mood and Affect: Mood " normal.         Behavior: Behavior normal.         Thought Content: Thought content normal.         Judgment: Judgment normal.                    ASSESSMENT/ PLAN:    Diagnoses and all orders for this visit:    1. Hypertriglyceridemia (Primary)  Comments:  Discussed diet and exercise with patient. Atorvastatin changed to crestor 40mg.    2. Type 2 diabetes mellitus with hyperglycemia, without long-term current use of insulin  Comments:  Discussed diet, exercise, and medication with patient today. I suggested insulin but he states that he can change his diet to improve A1C.    Other orders  -     rosuvastatin (Crestor) 40 MG tablet; Take 1 tablet by mouth Daily for 270 days.  Dispense: 90 tablet; Refill: 0      Procedures       Follow-up: Return in about 1 month (around 9/8/2024) for DM .      Maddy Murphy, APRN 8/8/2024 15:47 CDT  This note was electronically signed.

## 2024-08-08 NOTE — PATIENT INSTRUCTIONS
Diabetes Mellitus Basics    Diabetes mellitus, or diabetes, is a long-term (chronic) disease. It occurs when the body does not properly use sugar (glucose) that is released from food after you eat.  Diabetes mellitus may be caused by one or both of these problems:  Your pancreas does not make enough of a hormone called insulin.  Your body does not react in a normal way to the insulin that it makes.  Insulin lets glucose enter cells in your body. This gives you energy. If you have diabetes, glucose cannot get into cells. This causes high blood glucose (hyperglycemia).  How to treat and manage diabetes  You may need to take insulin or other diabetes medicines daily to keep your glucose in balance. If you are prescribed insulin, you will learn how to give yourself insulin by injection. You may need to adjust the amount of insulin you take based on the foods that you eat.  You will need to check your blood glucose levels using a glucose monitor as told by your health care provider. The readings can help determine if you have low or high blood glucose.  Generally, you should have these blood glucose levels:  Before meals (preprandial):  mg/dL (4.4-7.2 mmol/L).  After meals (postprandial): below 180 mg/dL (10 mmol/L).  Hemoglobin A1c (HbA1c) level: less than 7%.  Your health care provider will set treatment goals for you.  Keep all follow-up visits. This is important.  Low blood glucose  Low blood glucose (hypoglycemia) is when glucose is at or below 70 mg/dL (3.9 mmol/L). Symptoms may include:  Feeling:  Hungry.  Sweaty and clammy.  Irritable or easily upset.  Dizzy.  Sleepy.  Having:  A fast heartbeat.  A headache.  A change in your vision.  Numbness around the mouth, lips, or tongue.  Having trouble with:  Moving (coordination).  Sleeping.  Treating low blood glucose  To treat low blood glucose, eat or drink something containing sugar right away. If you can think clearly and swallow safely, follow the 15:15  rule:  Take 15 grams of a fast-acting carb (carbohydrate), as told by your health care provider.  Some fast-acting carbs are:  Glucose tablets: take 3-4 tablets.  Hard candy: eat 3-5 pieces.  Fruit juice: drink 4 oz (120 mL).  Regular (not diet) soda: drink 4-6 oz (120-180 mL).  Honey or sugar: eat 1 Tbsp (15 mL).  Check your blood glucose levels 15 minutes after you take the carb.  If your glucose is still at or below 70 mg/dL (3.9 mmol/L), take 15 grams of a carb again.  If your glucose does not go above 70 mg/dL (3.9 mmol/L) after 3 tries, get help right away.  After your glucose goes back to normal, eat a meal or a snack within 1 hour.  Treating very low blood glucose  If your glucose is at or below 54 mg/dL (3 mmol/L), you have very low blood glucose (severe hypoglycemia).  This is an emergency. Do not wait to see if the symptoms will go away. Get medical help right away. Call your local emergency services (911 in the [place] Do not drive yourself to the hospital.  Questions to ask your health care provider  Should I talk with a diabetes educator?  What equipment will I need to care for myself at home?  What diabetes medicines do I need? When should I take them?  How often do I need to check my blood glucose levels?  What number can I call if I have questions?  When is my follow-up visit?  Where can I find a support group for people with diabetes?  Where to find more information  American Diabetes Association: www.diabetes.org  Association of Diabetes Care and Education Specialists: www.diabeteseducator.org  Contact a health care provider if:  Your blood glucose is at or above 240 mg/dL (13.3 mmol/L) for 2 days in a row.  You have been sick or have had a fever for 2 days or more, and you are not getting better.  You have any of these problems for more than 6 hours:  You cannot eat or drink.  You feel nauseous.  You vomit.  You have diarrhea.  Get help right away if:  Your blood glucose is lower than 54 mg/dL (3  mmol/L).  You get confused.  You have trouble thinking clearly.  You have trouble breathing.  These symptoms may represent a serious problem that is an emergency. Do not wait to see if the symptoms will go away. Get medical help right away. Call your local emergency services (911 in the [place] Do not drive yourself to the hospital.  Summary  Diabetes mellitus is a chronic disease that occurs when the body does not properly use sugar (glucose) that is released from food after you eat.  Take insulin and diabetes medicines as told.  Check your blood glucose every day, as often as told.  Keep all follow-up visits. This is important.  This information is not intended to replace advice given to you by your health care provider. Make sure you discuss any questions you have with your health care provider.  Document Revised: 04/20/2021 Document Reviewed: 04/20/2021  [employer] Patient Education © 2024 [employer]

## 2024-08-15 ENCOUNTER — TELEPHONE (OUTPATIENT)
Dept: HEMATOLOGY | Age: 74
End: 2024-08-15

## 2024-08-15 DIAGNOSIS — C61 PROSTATE CANCER (HCC): Primary | ICD-10-CM

## 2024-08-15 NOTE — TELEPHONE ENCOUNTER
Called pt. to remind them of appointment on 08/16/2024 and had to leave a detailed voicemail with appointment date and time. Reminded patient to just come at appointment time, and to not come at the lab appointment time. Reminded patient that we will not check them in any more than 30 minutes before appointment time. We have now moved to the [facility] [place] that is located between our old office and the ER at the [facility]

## 2024-08-15 NOTE — PROGRESS NOTES
invasion present. Prostate, left base, biopsy: prostatic adenocarcinoma, [place] score 4+5=9 (15% pattern 5) (Grade group 5), in 6 of 6 cores, involving 80% of total tissue and measuring 49mm in aggregate; perineural invasion present.   9/25/18 MRI prostate ([facility]): Biopsy-proven prostate cancer in the left base peripheral zone measures 2.3 cm in greatest dimension. There is no definite extracapsular extension or involvement of the neurovascular bundle. There are no abnormal appearing nodes in the pelvis. PIRADS 5. Extensive diverticulosis of the sigmoid colon.   10/3/18 Robot assisted radical prostatectomy with negative (0/3) lymph node dissection by Dr Mike [facility]  10/3/18 Prostate, robot assisted laparoscopic prostatectomy: Prostatic adenocarcinoma. Lymph nodes, right pelvic, dissection: two lymph nodes, negative for metastatic carcinoma. Lymph nodes, left pelvic, dissection: one lymph nodes, negative for metastatic carcinoma. Histologic Type: Acinar adenocarcinoma. Histologic Grade: Primary [place] Pattern: Grade 4.  Secondary [place] Pattern: Grade 5.  Tertiary Pattern not applicable. Total [place] Score: 9.  Grade group 5. Percentage of pattern 4: 80%.  Percentage of pattern 5: 20% Tumor Quantitation: Estimated percentage of prostate involved by tumor: 52% Extraprostatic Extension: Present, nonfocal.  Location of Extraprostatic Extension:  Left lateral.  Left postero-lateral (neurovascular bundle).  Urinary Bladder Neck Invasion:    Not identified. Seminal Vesicle Invasion: Present - Left. Margins: Uninvolved by invasive carcinoma. Treatment Effect on Carcinoma: No known presurgical therapy. Lymphovascular Invasion: Not identified. Perineural. Invasion: Present. TNM Descriptors: Not applicable. Primary Tumor (pT): pT3b:  Seminal vesicle invasion. Regional Lymph Nodes (pN): pN0: No regional lymph node metastasis. Number of regional lymph nodes examined: 3. Number of regional lymph nodes

## 2024-08-16 ENCOUNTER — HOSPITAL ENCOUNTER (OUTPATIENT)
Dept: INFUSION THERAPY | Age: 74
Discharge: HOME | End: 2024-08-16
Payer: MEDICARE

## 2024-08-16 ENCOUNTER — OFFICE VISIT (OUTPATIENT)
Dept: HEMATOLOGY | Age: 74
End: 2024-08-16

## 2024-08-16 VITALS
DIASTOLIC BLOOD PRESSURE: 64 MMHG | TEMPERATURE: 97.3 F | HEIGHT: 65 IN | SYSTOLIC BLOOD PRESSURE: 114 MMHG | WEIGHT: 153.2 LBS | OXYGEN SATURATION: 95 % | HEART RATE: 65 BPM | BODY MASS INDEX: 25.52 KG/M2

## 2024-08-16 DIAGNOSIS — Z79.818 ANDROGEN DEPRIVATION THERAPY: ICD-10-CM

## 2024-08-16 DIAGNOSIS — Z71.89 CARE PLAN DISCUSSED WITH PATIENT: ICD-10-CM

## 2024-08-16 DIAGNOSIS — C61 PROSTATE CANCER (HCC): Primary | ICD-10-CM

## 2024-08-16 DIAGNOSIS — E11.65 HYPERGLYCEMIA DUE TO DIABETES MELLITUS (HCC): ICD-10-CM

## 2024-08-16 LAB
ALBUMIN SERPL-MCNC: 4.1 G/DL (ref 3.5–5.2)
ALP SERPL-CCNC: 103 U/L (ref 40–129)
ALT SERPL-CCNC: 23 U/L (ref 5–41)
ANION GAP SERPL CALCULATED.3IONS-SCNC: 12 MMOL/L (ref 7–19)
AST SERPL-CCNC: 16 U/L (ref 5–40)
BASOPHILS # BLD: 0.03 K/UL (ref 0.01–0.08)
BASOPHILS NFR BLD: 0.4 % (ref 0.1–1.2)
BILIRUB SERPL-MCNC: 0.5 MG/DL (ref 0–1.2)
BUN SERPL-MCNC: 27 MG/DL (ref 8–23)
CALCIUM SERPL-MCNC: 9.2 MG/DL (ref 8.8–10.2)
CHLORIDE SERPL-SCNC: 100 MMOL/L (ref 98–107)
CO2 SERPL-SCNC: 23 MMOL/L (ref 22–29)
CREAT SERPL-MCNC: 0.9 MG/DL (ref 0.7–1.2)
EOSINOPHIL # BLD: 0.31 K/UL (ref 0.04–0.54)
EOSINOPHIL NFR BLD: 4.4 % (ref 0.7–7)
ERYTHROCYTE [DISTWIDTH] IN BLOOD BY AUTOMATED COUNT: 13.4 % (ref 11.6–14.4)
GLUCOSE SERPL-MCNC: 298 MG/DL (ref 70–99)
HCT VFR BLD AUTO: 38.8 % (ref 40.1–51)
HGB BLD-MCNC: 13.9 G/DL (ref 13.7–17.5)
LYMPHOCYTES # BLD: 1.24 K/UL (ref 1.18–3.74)
LYMPHOCYTES NFR BLD: 17.6 % (ref 19.3–53.1)
MCH RBC QN AUTO: 30 PG (ref 25.7–32.2)
MCHC RBC AUTO-ENTMCNC: 35.8 G/DL (ref 32.3–36.5)
MCV RBC AUTO: 83.6 FL (ref 79–92.2)
MONOCYTES # BLD: 0.54 K/UL (ref 0.24–0.82)
MONOCYTES NFR BLD: 7.7 % (ref 4.7–12.5)
NEUTROPHILS # BLD: 4.88 K/UL (ref 1.56–6.13)
NEUTS SEG NFR BLD: 69.5 % (ref 34–71.1)
PLATELET # BLD AUTO: 179 K/UL (ref 163–337)
PMV BLD AUTO: 10 FL (ref 7.4–10.4)
POTASSIUM SERPL-SCNC: 4.5 MMOL/L (ref 3.5–5.1)
PROT SERPL-MCNC: 6.8 G/DL (ref 6.4–8.3)
PSA SERPL-MCNC: <0.01 NG/ML (ref 0–4)
RBC # BLD AUTO: 4.64 M/UL (ref 4.63–6.08)
SODIUM SERPL-SCNC: 135 MMOL/L (ref 136–145)
WBC # BLD AUTO: 7.03 K/UL (ref 4.23–9.07)

## 2024-08-16 PROCEDURE — 36415 COLL VENOUS BLD VENIPUNCTURE: CPT

## 2024-08-16 PROCEDURE — 99213 OFFICE O/P EST LOW 20 MIN: CPT

## 2024-08-16 PROCEDURE — 96402 CHEMO HORMON ANTINEOPL SQ/IM: CPT

## 2024-08-16 PROCEDURE — 96372 THER/PROPH/DIAG INJ SC/IM: CPT

## 2024-08-16 PROCEDURE — 80053 COMPREHEN METABOLIC PANEL: CPT

## 2024-08-16 PROCEDURE — 85025 COMPLETE CBC W/AUTO DIFF WBC: CPT

## 2024-08-16 PROCEDURE — 6360000002 HC RX W HCPCS: Performed by: INTERNAL MEDICINE

## 2024-08-16 RX ORDER — EPINEPHRINE 1 MG/ML
0.3 INJECTION, SOLUTION, CONCENTRATE INTRAVENOUS PRN
OUTPATIENT
Start: 2024-09-20

## 2024-08-16 RX ORDER — SODIUM CHLORIDE 9 MG/ML
INJECTION, SOLUTION INTRAVENOUS CONTINUOUS
OUTPATIENT
Start: 2024-09-20

## 2024-08-16 RX ORDER — FAMOTIDINE 10 MG/ML
20 INJECTION, SOLUTION INTRAVENOUS
OUTPATIENT
Start: 2024-09-20

## 2024-08-16 RX ORDER — ONDANSETRON 2 MG/ML
8 INJECTION INTRAMUSCULAR; INTRAVENOUS
OUTPATIENT
Start: 2024-09-20

## 2024-08-16 RX ORDER — DIPHENHYDRAMINE HYDROCHLORIDE 50 MG/ML
50 INJECTION INTRAMUSCULAR; INTRAVENOUS
OUTPATIENT
Start: 2024-09-20

## 2024-08-16 RX ORDER — ACETAMINOPHEN 325 MG/1
650 TABLET ORAL
OUTPATIENT
Start: 2024-09-20

## 2024-08-16 RX ORDER — ALBUTEROL SULFATE 90 UG/1
4 AEROSOL, METERED RESPIRATORY (INHALATION) PRN
OUTPATIENT
Start: 2024-09-20

## 2024-08-16 RX ADMIN — LEUPROLIDE ACETATE 22.5 MG: KIT at 10:50

## 2024-09-10 ENCOUNTER — OFFICE VISIT (OUTPATIENT)
Dept: FAMILY MEDICINE CLINIC | Facility: CLINIC | Age: 74
End: 2024-09-10
Payer: MEDICARE

## 2024-09-10 VITALS
DIASTOLIC BLOOD PRESSURE: 70 MMHG | BODY MASS INDEX: 27.39 KG/M2 | HEART RATE: 78 BPM | TEMPERATURE: 97.8 F | SYSTOLIC BLOOD PRESSURE: 115 MMHG | HEIGHT: 63 IN | OXYGEN SATURATION: 96 % | WEIGHT: 154.6 LBS

## 2024-09-10 DIAGNOSIS — R53.1 WEAKNESS: ICD-10-CM

## 2024-09-10 DIAGNOSIS — E11.65 TYPE 2 DIABETES MELLITUS WITH HYPERGLYCEMIA, WITHOUT LONG-TERM CURRENT USE OF INSULIN: Chronic | ICD-10-CM

## 2024-09-10 DIAGNOSIS — J44.9 CHRONIC OBSTRUCTIVE PULMONARY DISEASE, UNSPECIFIED COPD TYPE: Primary | Chronic | ICD-10-CM

## 2024-09-10 PROCEDURE — 3078F DIAST BP <80 MM HG: CPT | Performed by: NURSE PRACTITIONER

## 2024-09-10 PROCEDURE — 3074F SYST BP LT 130 MM HG: CPT | Performed by: NURSE PRACTITIONER

## 2024-09-10 PROCEDURE — 3046F HEMOGLOBIN A1C LEVEL >9.0%: CPT | Performed by: NURSE PRACTITIONER

## 2024-09-10 PROCEDURE — 99214 OFFICE O/P EST MOD 30 MIN: CPT | Performed by: NURSE PRACTITIONER

## 2024-09-10 RX ORDER — SEMAGLUTIDE 1.34 MG/ML
INJECTION, SOLUTION SUBCUTANEOUS
Qty: 2 ML | Refills: 0 | Status: SHIPPED | OUTPATIENT
Start: 2024-09-10

## 2024-09-10 RX ORDER — ALBUTEROL SULFATE 90 UG/1
2 AEROSOL, METERED RESPIRATORY (INHALATION)
Qty: 18 G | Refills: 5 | Status: SHIPPED | OUTPATIENT
Start: 2024-09-10

## 2024-09-10 NOTE — PROGRESS NOTES
"Chief Complaint   Patient presents with    Hyperlipidemia     Follow up         Subjective   William Mclean is a 73 y.o. male who presents today for the following   COPD- Patient is using albuterol inhaler often but states he still get short of breath at times. Patient has been diagnosed with COPD in the past. He is a current smoker.   DM- Patient has reading of fasting blood glucose. They are running between 160-200. He is taking his medication as prescribed. He has improved his diet.   Weakness- Patient states that he has noticed that he is not as strong as he has been. He is concerned that the lupron is causing him to lose muscle. He has one more shot until he is done with them.     No Known Allergies      OBJECTIVE:  Vitals:    09/10/24 1056   BP: 115/70   BP Location: Left arm   Patient Position: Sitting   Cuff Size: Large Adult   Pulse: 78   Temp: 97.8 °F (36.6 °C)   TempSrc: Infrared   SpO2: 96%   Weight: 70.1 kg (154 lb 9.6 oz)   Height: 160 cm (63\")     Physical Exam  Vitals and nursing note reviewed.   Constitutional:       Appearance: Normal appearance.   HENT:      Head: Normocephalic and atraumatic.      Nose: Nose normal.   Eyes:      Pupils: Pupils are equal, round, and reactive to light.   Cardiovascular:      Rate and Rhythm: Normal rate and regular rhythm.   Pulmonary:      Breath sounds: Decreased breath sounds present. No wheezing or rhonchi.   Abdominal:      General: Bowel sounds are normal.   Musculoskeletal:         General: Normal range of motion.   Skin:     General: Skin is warm and dry.   Neurological:      Mental Status: He is alert and oriented to person, place, and time.   Psychiatric:         Mood and Affect: Mood normal.         Behavior: Behavior normal.                    ASSESSMENT/ PLAN:    Diagnoses and all orders for this visit:    1. Chronic obstructive pulmonary disease, unspecified COPD type (Primary)  -     albuterol sulfate  (90 Base) MCG/ACT inhaler; Inhale 2 puffs " Every 4 (Four) to 6 (Six) Hours As Needed for Wheezing or Shortness of Air (or persistant cough.).  Dispense: 18 g; Refill: 5  -     Budeson-Glycopyrrol-Formoterol (BREZTRI) 160-9-4.8 MCG/ACT aerosol inhaler; Inhale 2 puffs 2 (Two) Times a Day.  Dispense: 1 each; Refill: 2    2. Type 2 diabetes mellitus with hyperglycemia, without long-term current use of insulin  -     Semaglutide,0.25 or 0.5MG/DOS, (Ozempic, 0.25 or 0.5 MG/DOSE,) 2 MG/1.5ML solution pen-injector; 0.25mg weekly x 4 weeks  Dispense: 2 mL; Refill: 0    3. Weakness    Consider discussing weakness with oncologist. I encouraged patient to consider physical therapy.ozempic and breztri prescribed today. Patient and I discussed side effects of medication.    Procedures       Follow-up: Return in about 4 weeks (around 10/8/2024) for DM.      Maddy Murphy, APRN 9/10/2024 14:25 CDT  This note was electronically signed.

## 2024-11-19 ENCOUNTER — TELEPHONE (OUTPATIENT)
Dept: FAMILY MEDICINE CLINIC | Facility: CLINIC | Age: 74
End: 2024-11-19

## 2024-11-19 ENCOUNTER — OFFICE VISIT (OUTPATIENT)
Dept: FAMILY MEDICINE CLINIC | Facility: CLINIC | Age: 74
End: 2024-11-19
Payer: MEDICARE

## 2024-11-19 VITALS
BODY MASS INDEX: 27.32 KG/M2 | OXYGEN SATURATION: 96 % | SYSTOLIC BLOOD PRESSURE: 133 MMHG | WEIGHT: 154.2 LBS | HEART RATE: 69 BPM | RESPIRATION RATE: 18 BRPM | TEMPERATURE: 97.8 F | DIASTOLIC BLOOD PRESSURE: 79 MMHG | HEIGHT: 63 IN

## 2024-11-19 DIAGNOSIS — Z23 NEEDS FLU SHOT: ICD-10-CM

## 2024-11-19 DIAGNOSIS — Z12.11 SCREENING FOR COLON CANCER: ICD-10-CM

## 2024-11-19 DIAGNOSIS — E11.65 TYPE 2 DIABETES MELLITUS WITH HYPERGLYCEMIA, WITHOUT LONG-TERM CURRENT USE OF INSULIN: ICD-10-CM

## 2024-11-19 DIAGNOSIS — Z00.00 MEDICARE ANNUAL WELLNESS VISIT, SUBSEQUENT: Primary | ICD-10-CM

## 2024-11-19 DIAGNOSIS — Z11.59 ENCOUNTER FOR HEPATITIS C SCREENING TEST FOR LOW RISK PATIENT: ICD-10-CM

## 2024-11-19 PROBLEM — Z12.5 SCREENING FOR PROSTATE CANCER: Status: ACTIVE | Noted: 2024-11-19

## 2024-11-19 PROCEDURE — 1125F AMNT PAIN NOTED PAIN PRSNT: CPT | Performed by: NURSE PRACTITIONER

## 2024-11-19 PROCEDURE — G0008 ADMIN INFLUENZA VIRUS VAC: HCPCS | Performed by: NURSE PRACTITIONER

## 2024-11-19 PROCEDURE — 1160F RVW MEDS BY RX/DR IN RCRD: CPT | Performed by: NURSE PRACTITIONER

## 2024-11-19 PROCEDURE — 96160 PT-FOCUSED HLTH RISK ASSMT: CPT | Performed by: NURSE PRACTITIONER

## 2024-11-19 PROCEDURE — 1170F FXNL STATUS ASSESSED: CPT | Performed by: NURSE PRACTITIONER

## 2024-11-19 PROCEDURE — 90662 IIV NO PRSV INCREASED AG IM: CPT | Performed by: NURSE PRACTITIONER

## 2024-11-19 PROCEDURE — 3075F SYST BP GE 130 - 139MM HG: CPT | Performed by: NURSE PRACTITIONER

## 2024-11-19 PROCEDURE — 3046F HEMOGLOBIN A1C LEVEL >9.0%: CPT | Performed by: NURSE PRACTITIONER

## 2024-11-19 PROCEDURE — 3078F DIAST BP <80 MM HG: CPT | Performed by: NURSE PRACTITIONER

## 2024-11-19 PROCEDURE — G0439 PPPS, SUBSEQ VISIT: HCPCS | Performed by: NURSE PRACTITIONER

## 2024-11-19 PROCEDURE — 1159F MED LIST DOCD IN RCRD: CPT | Performed by: NURSE PRACTITIONER

## 2024-11-19 RX ORDER — ELECTROLYTES/DEXTROSE
1 SOLUTION, ORAL ORAL DAILY
Qty: 30 TABLET | Refills: 5 | Status: SHIPPED | OUTPATIENT
Start: 2024-11-19

## 2024-11-19 NOTE — PROGRESS NOTES
Subjective   The ABCs of the Annual Wellness Visit  Medicare Wellness Visit      William Mclean is a 73 y.o. patient who presents for a Medicare Wellness Visit.    The following portions of the patient's history were reviewed and   updated as appropriate: allergies, current medications, past family history, past medical history, past social history, past surgical history, and problem list.    Compared to one year ago, the patient's physical   health is worse.  Compared to one year ago, the patient's mental   health is the same.    Diabetic eye exam- due   Colonoscopy- due. Referral to gastro placed.     Recent Hospitalizations:  He was not admitted to the hospital during the last year.     Current Medical Providers:  Patient Care Team:  Maddy Murphy APRN as PCP - General (Family Medicine)    Outpatient Medications Prior to Visit   Medication Sig Dispense Refill    albuterol sulfate  (90 Base) MCG/ACT inhaler Inhale 2 puffs Every 4 (Four) to 6 (Six) Hours As Needed for Wheezing or Shortness of Air (or persistant cough.). 18 g 5    aspirin 81 MG chewable tablet Chew 1 tablet Daily.      Blood Glucose Monitoring Suppl (True Metrix Air Glucose Meter) w/Device kit       Budeson-Glycopyrrol-Formoterol (BREZTRI) 160-9-4.8 MCG/ACT aerosol inhaler Inhale 2 puffs 2 (Two) Times a Day. 1 each 2    carvedilol (COREG) 25 MG tablet Take 1 tablet by mouth 2 (Two) Times a Day.      Cholecalciferol (D3 ADULT) 1000 UNITS chewable tablet Chew.      doxazosin (CARDURA) 2 MG tablet Take 1 tablet by mouth Every Night.      empagliflozin (JARDIANCE) 25 MG tablet tablet Take 1 tablet by mouth Daily.      glimepiride (AMARYL) 4 MG tablet Take 1 tablet by mouth Every Morning Before Breakfast.      glucose blood test strip Check blood glucose three times a day. 90 each 12    Lancets (onetouch ultrasoft) lancets Check blood glucose three times a day. 90 each 12    Leuprolide Acetate (LUPRON IJ) Inject  as directed Every 3 (Three)  Months.      lisinopril (PRINIVIL,ZESTRIL) 20 MG tablet Take 1 tablet by mouth Daily.      loperamide (IMODIUM) 2 MG capsule Take 1 capsule by mouth 2 (Two) Times a Day As Needed.      Magnesium Chloride 64 MG tablet Take  by mouth.      metFORMIN (GLUCOPHAGE) 500 MG tablet Take 1 tablet by mouth 2 (Two) Times a Day With Meals.      omeprazole (priLOSEC) 20 MG capsule Take 1 capsule by mouth Daily.      rosuvastatin (Crestor) 40 MG tablet Take 1 tablet by mouth Daily for 270 days. 90 tablet 0    Semaglutide,0.25 or 0.5MG/DOS, (Ozempic, 0.25 or 0.5 MG/DOSE,) 2 MG/1.5ML solution pen-injector 0.25mg weekly x 4 weeks 2 mL 0    vitamin B-12 (CYANOCOBALAMIN) 1000 MCG tablet Take 6 tablets by mouth Daily.      fluticasone (FLONASE) 50 MCG/ACT nasal spray 2 sprays into the nostril(s) as directed by provider Daily for 30 days. 18.2 mL 5    calcium & magnesium carbonates (MYLANTA) 311-232 MG per tablet Take 1 tablet by mouth Daily. (Patient not taking: Reported on 11/19/2024)       No facility-administered medications prior to visit.     No opioid medication identified on active medication list. I have reviewed chart for other potential  high risk medication/s and harmful drug interactions in the elderly.      Aspirin is on active medication list. Aspirin use is indicated based on review of current medical condition/s. Pros and cons of this therapy have been discussed today. Benefits of this medication outweigh potential harm.  Patient has been encouraged to continue taking this medication.  .      Patient Active Problem List   Diagnosis    Type 2 diabetes mellitus with hyperglycemia, without long-term current use of insulin    Malignant neoplasm of prostate    Hypomagnesemia    Hypertriglyceridemia    Hypertension    COPD (chronic obstructive pulmonary disease)    S/P drug eluting coronary stent placement    Essential hypertension    Type 2 diabetes mellitus without complication    Screening for prostate cancer     Advance  "Care Planning Advance Directive is not on file.  ACP discussion was held with the patient during this visit. Patient does not have an advance directive, information provided.            Objective   Vitals:    24 0814   BP: 133/79   BP Location: Left arm   Patient Position: Sitting   Cuff Size: Large Adult   Pulse: 69   Resp: 18   Temp: 97.8 °F (36.6 °C)   TempSrc: Infrared   SpO2: 96%   Weight: 69.9 kg (154 lb 3.2 oz)   Height: 160 cm (63\")   PainSc:   2   PainLoc: Knee       Estimated body mass index is 27.32 kg/m² as calculated from the following:    Height as of this encounter: 160 cm (63\").    Weight as of this encounter: 69.9 kg (154 lb 3.2 oz).            Does the patient have evidence of cognitive impairment? No                                                                                               Health  Risk Assessment    Smoking Status:  Social History     Tobacco Use   Smoking Status Every Day    Current packs/day: 1.00    Average packs/day: 1 pack/day for 0.9 years (0.9 ttl pk-yrs)    Types: Cigarettes    Start date:     Passive exposure: Past   Smokeless Tobacco Never     Alcohol Consumption:  Social History     Substance and Sexual Activity   Alcohol Use Defer       Fall Risk Screen  STEADI Fall Risk Assessment was completed, and patient is at HIGH risk for falls. Assessment completed on:2024    Depression Screening   Little interest or pleasure in doing things? Not at all   Feeling down, depressed, or hopeless? Not at all   PHQ-2 Total Score 0      Health Habits and Functional and Cognitive Screenin/19/2024     8:23 AM   Functional & Cognitive Status   Do you have difficulty preparing food and eating? No   Do you have difficulty bathing yourself, getting dressed or grooming yourself? No   Do you have difficulty using the toilet? No   Do you have difficulty moving around from place to place? No   Do you have trouble with steps or getting out of a bed or a chair? No "   Current Diet Well Balanced Diet   Dental Exam Not up to date   Eye Exam Not up to date   Exercise (times per week) 0 times per week   Current Exercises Include No Regular Exercise   Do you need help using the phone?  No   Are you deaf or do you have serious difficulty hearing?  No   Do you need help to go to places out of walking distance? No   Do you need help shopping? No   Do you need help preparing meals?  No   Do you need help with housework?  No   Do you need help with laundry? No   Do you need help taking your medications? No   Do you need help managing money? No   Do you ever drive or ride in a car without wearing a seat belt? No   Have you felt unusual stress, anger or loneliness in the last month? No   Who do you live with? Spouse   If you need help, do you have trouble finding someone available to you? No   Have you been bothered in the last four weeks by sexual problems? Yes   Do you have difficulty concentrating, remembering or making decisions? No           Age-appropriate Screening Schedule:  Refer to the list below for future screening recommendations based on patient's age, sex and/or medical conditions. Orders for these recommended tests are listed in the plan section. The patient has been provided with a written plan.    Health Maintenance List  Health Maintenance   Topic Date Due    COLORECTAL CANCER SCREENING  Never done    DIABETIC EYE EXAM  Never done    ZOSTER VACCINE (1 of 2) Never done    Pneumococcal Vaccine 65+ (2 of 2 - PCV) 12/10/2019    HEPATITIS C SCREENING  Never done    COVID-19 Vaccine (3 - 2024-25 season) 09/01/2024    HEMOGLOBIN A1C  02/06/2025    LIPID PANEL  08/06/2025    BMI FOLLOWUP  09/10/2025    ANNUAL WELLNESS VISIT  11/19/2025    TDAP/TD VACCINES (2 - Td or Tdap) 12/02/2029    INFLUENZA VACCINE  Completed    AAA SCREEN (ONE-TIME)  Completed    URINE MICROALBUMIN  Discontinued                                                                                                                                                 CMS Preventative Services Quick Reference  Risk Factors Identified During Encounter  Depression/Dysphoria: Current medication is effective, no change recommended  Fall Risk-High or Moderate: Discussed Fall Prevention in the home and Information on Fall Prevention Shared in After Visit Summary    The above risks/problems have been discussed with the patient.  Pertinent information has been shared with the patient in the After Visit Summary.  An After Visit Summary and PPPS were made available to the patient.    Follow Up:   Next Medicare Wellness visit to be scheduled in 1 year.     Assessment & Plan  Medicare annual wellness visit, subsequent    Orders:    CBC & Differential    Comprehensive Metabolic Panel    Lipid Panel    Hemoglobin A1c    TSH    Needs flu shot    Orders:    Fluzone High-Dose 65+yrs (2368-4401)    Screening for colon cancer    Orders:    Ambulatory Referral to Gastroenterology    Encounter for hepatitis C screening test for low risk patient    Orders:    Hepatitis C antibody    Type 2 diabetes mellitus with hyperglycemia, without long-term current use of insulin      Orders:    Hemoglobin A1c         Follow Up:   No follow-ups on file.

## 2024-11-19 NOTE — TELEPHONE ENCOUNTER
Pt is needing metformin er 500mg and mag64 dr 64mg sent to [facility] please. He forgot to tell you at his appt. Thank you.

## 2024-11-20 LAB
ALBUMIN SERPL-MCNC: 4.1 G/DL (ref 3.8–4.8)
ALP SERPL-CCNC: 123 IU/L (ref 44–121)
ALT SERPL-CCNC: 16 IU/L (ref 0–44)
AST SERPL-CCNC: 14 IU/L (ref 0–40)
BASOPHILS # BLD AUTO: 0.1 X10E3/UL (ref 0–0.2)
BASOPHILS NFR BLD AUTO: 1 %
BILIRUB SERPL-MCNC: 0.2 MG/DL (ref 0–1.2)
BUN SERPL-MCNC: 21 MG/DL (ref 8–27)
BUN/CREAT SERPL: 20 (ref 10–24)
CALCIUM SERPL-MCNC: 9.9 MG/DL (ref 8.6–10.2)
CHLORIDE SERPL-SCNC: 99 MMOL/L (ref 96–106)
CHOLEST SERPL-MCNC: 281 MG/DL (ref 100–199)
CO2 SERPL-SCNC: 22 MMOL/L (ref 20–29)
CREAT SERPL-MCNC: 1.03 MG/DL (ref 0.76–1.27)
EGFRCR SERPLBLD CKD-EPI 2021: 78 ML/MIN/1.73
EOSINOPHIL # BLD AUTO: 0.4 X10E3/UL (ref 0–0.4)
EOSINOPHIL NFR BLD AUTO: 6 %
ERYTHROCYTE [DISTWIDTH] IN BLOOD BY AUTOMATED COUNT: 12.8 % (ref 11.6–15.4)
GLOBULIN SER CALC-MCNC: 2.7 G/DL (ref 1.5–4.5)
GLUCOSE SERPL-MCNC: 241 MG/DL (ref 70–99)
HBA1C MFR BLD: 9.4 % (ref 4.8–5.6)
HCT VFR BLD AUTO: 45.6 % (ref 37.5–51)
HCV IGG SERPL QL IA: NON REACTIVE
HDLC SERPL-MCNC: 30 MG/DL
HGB BLD-MCNC: 14.7 G/DL (ref 13–17.7)
IMM GRANULOCYTES # BLD AUTO: 0 X10E3/UL (ref 0–0.1)
IMM GRANULOCYTES NFR BLD AUTO: 0 %
LDLC SERPL CALC-MCNC: 140 MG/DL (ref 0–99)
LYMPHOCYTES # BLD AUTO: 1.3 X10E3/UL (ref 0.7–3.1)
LYMPHOCYTES NFR BLD AUTO: 19 %
MCH RBC QN AUTO: 29.1 PG (ref 26.6–33)
MCHC RBC AUTO-ENTMCNC: 32.2 G/DL (ref 31.5–35.7)
MCV RBC AUTO: 90 FL (ref 79–97)
MONOCYTES # BLD AUTO: 0.6 X10E3/UL (ref 0.1–0.9)
MONOCYTES NFR BLD AUTO: 8 %
NEUTROPHILS # BLD AUTO: 4.7 X10E3/UL (ref 1.4–7)
NEUTROPHILS NFR BLD AUTO: 66 %
PLATELET # BLD AUTO: 216 X10E3/UL (ref 150–450)
POTASSIUM SERPL-SCNC: 5 MMOL/L (ref 3.5–5.2)
PROT SERPL-MCNC: 6.8 G/DL (ref 6–8.5)
RBC # BLD AUTO: 5.06 X10E6/UL (ref 4.14–5.8)
SODIUM SERPL-SCNC: 135 MMOL/L (ref 134–144)
TRIGL SERPL-MCNC: 587 MG/DL (ref 0–149)
TSH SERPL DL<=0.005 MIU/L-ACNC: 2.39 UIU/ML (ref 0.45–4.5)
VLDLC SERPL CALC-MCNC: 111 MG/DL (ref 5–40)
WBC # BLD AUTO: 7.1 X10E3/UL (ref 3.4–10.8)

## 2024-12-04 DIAGNOSIS — E11.65 TYPE 2 DIABETES MELLITUS WITH HYPERGLYCEMIA, WITHOUT LONG-TERM CURRENT USE OF INSULIN: Chronic | ICD-10-CM

## 2024-12-04 RX ORDER — SEMAGLUTIDE 0.68 MG/ML
INJECTION, SOLUTION SUBCUTANEOUS
Qty: 3 ML | Refills: 0 | Status: SHIPPED | OUTPATIENT
Start: 2024-12-04

## 2024-12-04 NOTE — TELEPHONE ENCOUNTER
Rx Refill Note  Requested Prescriptions     Pending Prescriptions Disp Refills    Ozempic, 0.25 or 0.5 MG/DOSE, 2 MG/3ML solution pen-injector [Pharmacy Med Name: OZEMPIC (0.25 OR 0.5 MG/DOSE 2 SOPN] 3 mL 0     Sig: INJECT 0.25MG UNDER THE SKIN WEEKLY        Aliza Blake MA  12/04/24, 12:27 CST

## 2024-12-05 RX ORDER — ELECTROLYTES/DEXTROSE
1 SOLUTION, ORAL ORAL 2 TIMES DAILY
Qty: 60 TABLET | Refills: 5 | Status: SHIPPED | OUTPATIENT
Start: 2024-12-05

## 2024-12-26 ENCOUNTER — OFFICE VISIT (OUTPATIENT)
Dept: FAMILY MEDICINE CLINIC | Facility: CLINIC | Age: 74
End: 2024-12-26
Payer: MEDICARE

## 2024-12-26 VITALS
TEMPERATURE: 98.4 F | DIASTOLIC BLOOD PRESSURE: 78 MMHG | BODY MASS INDEX: 27.11 KG/M2 | HEART RATE: 79 BPM | SYSTOLIC BLOOD PRESSURE: 132 MMHG | OXYGEN SATURATION: 97 % | HEIGHT: 63 IN | RESPIRATION RATE: 19 BRPM | WEIGHT: 153 LBS

## 2024-12-26 DIAGNOSIS — E11.65 TYPE 2 DIABETES MELLITUS WITH HYPERGLYCEMIA, WITHOUT LONG-TERM CURRENT USE OF INSULIN: Primary | Chronic | ICD-10-CM

## 2024-12-26 DIAGNOSIS — J44.9 CHRONIC OBSTRUCTIVE PULMONARY DISEASE, UNSPECIFIED COPD TYPE: Chronic | ICD-10-CM

## 2024-12-26 PROCEDURE — 1160F RVW MEDS BY RX/DR IN RCRD: CPT | Performed by: NURSE PRACTITIONER

## 2024-12-26 PROCEDURE — 3046F HEMOGLOBIN A1C LEVEL >9.0%: CPT | Performed by: NURSE PRACTITIONER

## 2024-12-26 PROCEDURE — 3078F DIAST BP <80 MM HG: CPT | Performed by: NURSE PRACTITIONER

## 2024-12-26 PROCEDURE — 3075F SYST BP GE 130 - 139MM HG: CPT | Performed by: NURSE PRACTITIONER

## 2024-12-26 PROCEDURE — 1125F AMNT PAIN NOTED PAIN PRSNT: CPT | Performed by: NURSE PRACTITIONER

## 2024-12-26 PROCEDURE — 1159F MED LIST DOCD IN RCRD: CPT | Performed by: NURSE PRACTITIONER

## 2024-12-26 PROCEDURE — 99214 OFFICE O/P EST MOD 30 MIN: CPT | Performed by: NURSE PRACTITIONER

## 2024-12-26 RX ORDER — PROCHLORPERAZINE 25 MG/1
1 SUPPOSITORY RECTAL
Qty: 1 EACH | Refills: 4 | Status: SHIPPED | OUTPATIENT
Start: 2024-12-26

## 2024-12-26 RX ORDER — METFORMIN HYDROCHLORIDE 750 MG/1
750 TABLET, EXTENDED RELEASE ORAL
COMMUNITY
End: 2024-12-26 | Stop reason: SDUPTHER

## 2024-12-26 RX ORDER — METFORMIN HYDROCHLORIDE 750 MG/1
750 TABLET, EXTENDED RELEASE ORAL
Qty: 90 TABLET | Refills: 0 | Status: SHIPPED | OUTPATIENT
Start: 2024-12-26

## 2024-12-26 RX ORDER — PROCHLORPERAZINE 25 MG/1
SUPPOSITORY RECTAL
Qty: 3 EACH | Refills: 11 | Status: SHIPPED | OUTPATIENT
Start: 2024-12-26

## 2024-12-26 RX ORDER — PROCHLORPERAZINE 25 MG/1
1 SUPPOSITORY RECTAL ONCE
Qty: 1 EACH | Refills: 0 | Status: SHIPPED | OUTPATIENT
Start: 2024-12-26 | End: 2024-12-26

## 2024-12-26 NOTE — ASSESSMENT & PLAN NOTE
Orders:    Budeson-Glycopyrrol-Formoterol (BREZTRI) 160-9-4.8 MCG/ACT aerosol inhaler; Inhale 2 puffs 2 (Two) Times a Day for 90 days.

## 2024-12-26 NOTE — PROGRESS NOTES
"Chief Complaint   Patient presents with    Anorexia     Pt is on Ozempic, discussing side effects        Subjective   iWlliam Mclean is a 73 y.o. male who presents today for the following:    Diabetes  He presents for his follow-up diabetic visit. He has type 2 diabetes mellitus. His disease course has been fluctuating. His overall blood glucose range is 180-200 mg/dl.   Additional comments: Patient was recently prescribed ozempic for his diabetes. He would like to stopped medication due to side effects. Patient does not check blood glucose daily.          No Known Allergies      OBJECTIVE:  Vitals:    12/26/24 1333   BP: 132/78   BP Location: Left arm   Patient Position: Sitting   Cuff Size: Adult   Pulse: 79   Resp: 19   Temp: 98.4 °F (36.9 °C)   TempSrc: Oral   SpO2: 97%   Weight: 69.4 kg (153 lb)   Height: 160 cm (63\")     Physical Exam  Vitals and nursing note reviewed.   Constitutional:       Appearance: Normal appearance.   Cardiovascular:      Rate and Rhythm: Normal rate and regular rhythm.   Pulmonary:      Breath sounds: Normal breath sounds. No wheezing or rhonchi.   Abdominal:      General: Bowel sounds are normal.   Musculoskeletal:         General: Normal range of motion.   Skin:     General: Skin is warm and dry.   Neurological:      Mental Status: He is alert and oriented to person, place, and time.   Psychiatric:         Mood and Affect: Mood normal.         Behavior: Behavior normal.         Thought Content: Thought content normal.         Judgment: Judgment normal.                    ASSESSMENT/ PLAN:  Assessment & Plan  Chronic obstructive pulmonary disease, unspecified COPD type      Orders:    Budeson-Glycopyrrol-Formoterol (BREZTRI) 160-9-4.8 MCG/ACT aerosol inhaler; Inhale 2 puffs 2 (Two) Times a Day for 90 days.    Type 2 diabetes mellitus with hyperglycemia, without long-term current use of insulin    Discussed insulin. Patient declined at this time. He states he has improved his diet. He " would like to try improving diet/exercise before insulin.   Dexcom was ordered to help monitor blood glucose.   Orders:    metFORMIN ER (GLUCOPHAGE-XR) 750 MG 24 hr tablet; Take 1 tablet by mouth Daily With Breakfast.    Continuous Glucose Transmitter (Dexcom G6 Transmitter) misc; Use 1 Units Every 3 (Three) Months.    Continuous Glucose Sensor (Dexcom G6 Sensor); Use Every 10 (Ten) Days.    Continuous Glucose  (Dexcom G6 ) device; Use 1 Units 1 (One) Time for 1 dose.    empagliflozin (JARDIANCE) 25 MG tablet tablet; Take 1 tablet by mouth Daily.         Procedures     Management Plan:     An After Visit Summary was printed and given to the patient at discharge.    Follow-up: Return in about 2 months (around 2/26/2025) for A1C.         Maddy Murphy, APRN 12/26/2024 15:56 CST  This note was electronically signed.           4 = No assist / stand by assistance

## 2024-12-26 NOTE — ASSESSMENT & PLAN NOTE
Discussed insulin. Patient declined at this time. He states he has improved his diet. He would like to try improving diet/exercise before insulin.   Dexcom was ordered to help monitor blood glucose.   Orders:    metFORMIN ER (GLUCOPHAGE-XR) 750 MG 24 hr tablet; Take 1 tablet by mouth Daily With Breakfast.    Continuous Glucose Transmitter (Dexcom G6 Transmitter) misc; Use 1 Units Every 3 (Three) Months.    Continuous Glucose Sensor (Dexcom G6 Sensor); Use Every 10 (Ten) Days.    Continuous Glucose  (Dexcom G6 ) device; Use 1 Units 1 (One) Time for 1 dose.    empagliflozin (JARDIANCE) 25 MG tablet tablet; Take 1 tablet by mouth Daily.

## 2024-12-27 ENCOUNTER — TELEPHONE (OUTPATIENT)
Dept: FAMILY MEDICINE CLINIC | Facility: CLINIC | Age: 74
End: 2024-12-27
Payer: MEDICARE

## 2025-01-06 NOTE — TELEPHONE ENCOUNTER
PA denied- insurance reports that must must have treatment with insulin    OR     has a documented history of problematic hypoglycemia with documentation of one of the following:   -More than one level 2 hypoglycemic event (defined as glucose less than 54mg/dL (3.0mmol/L)) that persists despite more than one attempt to adjust medication therapy or modification of diabetes treatment plan   -History of one level 3 hypoglycemic event (glucose less than 54mg/dL (3.0mmol/L)) characterized by altered mental or physical state requiring hypoglycemia treatment

## 2025-01-15 ENCOUNTER — TELEPHONE (OUTPATIENT)
Dept: HEMATOLOGY | Age: 75
End: 2025-01-15

## 2025-01-15 NOTE — TELEPHONE ENCOUNTER
I called patient and left detailed voicemail about their appointment on 01/17/2025. I made patient aware not to arrive any earlier than the appointment time and to come at the time of the follow up not the time of the lab appointment if it is different than the follow up appt time. I also made patient aware to eat and drink plenty of water to hydrate properly before coming to these appointments because this will make their lab draw much easier. Made patient aware that we are now located at the [facility] at [address] [place] [address]. Located between [facility] and the [facility].

## 2025-01-16 DIAGNOSIS — C61 PROSTATE CANCER (HCC): Primary | ICD-10-CM

## 2025-01-16 RX ORDER — ACETAMINOPHEN 325 MG/1
650 TABLET ORAL
OUTPATIENT
Start: 2025-01-16

## 2025-01-16 RX ORDER — HYDROCORTISONE SODIUM SUCCINATE 100 MG/2ML
100 INJECTION INTRAMUSCULAR; INTRAVENOUS
OUTPATIENT
Start: 2025-01-16

## 2025-01-16 RX ORDER — ALBUTEROL SULFATE 90 UG/1
4 INHALANT RESPIRATORY (INHALATION) PRN
OUTPATIENT
Start: 2025-01-16

## 2025-01-16 RX ORDER — ONDANSETRON 2 MG/ML
8 INJECTION INTRAMUSCULAR; INTRAVENOUS
OUTPATIENT
Start: 2025-01-16

## 2025-01-16 RX ORDER — DIPHENHYDRAMINE HYDROCHLORIDE 50 MG/ML
50 INJECTION INTRAMUSCULAR; INTRAVENOUS
OUTPATIENT
Start: 2025-01-16

## 2025-01-16 RX ORDER — FAMOTIDINE 10 MG/ML
20 INJECTION, SOLUTION INTRAVENOUS
OUTPATIENT
Start: 2025-01-16

## 2025-01-16 RX ORDER — EPINEPHRINE 1 MG/ML
0.3 INJECTION, SOLUTION, CONCENTRATE INTRAVENOUS PRN
OUTPATIENT
Start: 2025-01-16

## 2025-01-16 RX ORDER — SODIUM CHLORIDE 9 MG/ML
INJECTION, SOLUTION INTRAVENOUS CONTINUOUS
OUTPATIENT
Start: 2025-01-16

## 2025-01-16 NOTE — PROGRESS NOTES
2025  Enzalutamide according to FDA approval for serologic progression.  Patient was not interested at this time.    Cancer Markers:  Lab Results   Component Value Date    PSA <0.01 01/17/2025    PSA <0.01 08/16/2024    PSA <0.01 12/18/2023     PLAN:  RTC with MD patient will call office to schedule  CBC, CMP, PSA today and MD visit  Recommend repeat PSA every 4 months to monitor PSA, patient will call to schedule  Continue follow-up with Dr. Lopez/[facility]  Proceed with colorectal cancer screening with Dr.Ross Jenkins/[facility], 3/17/25      Follow Up:   Return for Patient will call clinic to schedule follow-up with .  Data Unavailable    I, Marce Jefferson am pre charting  as Medical Assistant for Little Monge MD. Electronically signed by Marce Jefferson MA on 1/17/2025 at 8:16 AM CST.    I have seen, examined and reviewed this patient medication list, appropriate labs and imaging studies. I reviewed relevant medical records and others physician’s notes. I discussed the plans of care with the patient. I answered all the questions to the patient’s satisfaction. I have also reviewed the chief complaint (CC) and part of the history (History of Present Illness (HPI), Past Family Social History (PFSH), or Review of Systems (ROS) and made changes when appropriated.       (Please note that portions of this note were completed with a voice recognition program. Efforts were made to edit the dictations but occasionally words are mis-transcribed.)Electronically signed by Little Monge MD on 1/17/2025 at 4:41 PM

## 2025-01-17 ENCOUNTER — HOSPITAL ENCOUNTER (OUTPATIENT)
Dept: INFUSION THERAPY | Age: 75
Discharge: HOME | End: 2025-01-17
Payer: MEDICARE

## 2025-01-17 ENCOUNTER — OFFICE VISIT (OUTPATIENT)
Dept: HEMATOLOGY | Age: 75
End: 2025-01-17
Payer: MEDICARE

## 2025-01-17 VITALS
WEIGHT: 151.8 LBS | HEIGHT: 65 IN | OXYGEN SATURATION: 98 % | HEART RATE: 56 BPM | TEMPERATURE: 97.5 F | DIASTOLIC BLOOD PRESSURE: 78 MMHG | SYSTOLIC BLOOD PRESSURE: 132 MMHG | BODY MASS INDEX: 25.29 KG/M2

## 2025-01-17 DIAGNOSIS — Z71.89 CARE PLAN DISCUSSED WITH PATIENT: ICD-10-CM

## 2025-01-17 DIAGNOSIS — C61 PROSTATE CANCER (HCC): Primary | ICD-10-CM

## 2025-01-17 DIAGNOSIS — C61 PROSTATE CANCER (HCC): ICD-10-CM

## 2025-01-17 DIAGNOSIS — R23.2 HOT FLASH IN MALE: ICD-10-CM

## 2025-01-17 LAB
ALBUMIN SERPL-MCNC: 4 G/DL (ref 3.5–5.2)
ALP SERPL-CCNC: 107 U/L (ref 40–129)
ALT SERPL-CCNC: 14 U/L (ref 5–41)
ANION GAP SERPL CALCULATED.3IONS-SCNC: 10 MMOL/L (ref 7–19)
AST SERPL-CCNC: 15 U/L (ref 5–40)
BASOPHILS # BLD: 0.03 K/UL (ref 0–0.2)
BASOPHILS NFR BLD: 0.5 % (ref 0–1)
BILIRUB SERPL-MCNC: 0.3 MG/DL (ref 0–1.2)
BUN SERPL-MCNC: 15 MG/DL (ref 8–23)
CALCIUM SERPL-MCNC: 9.8 MG/DL (ref 8.8–10.2)
CHLORIDE SERPL-SCNC: 100 MMOL/L (ref 98–107)
CO2 SERPL-SCNC: 26 MMOL/L (ref 22–29)
CREAT SERPL-MCNC: 0.9 MG/DL (ref 0.7–1.2)
EOSINOPHIL # BLD: 0.31 K/UL (ref 0–0.6)
EOSINOPHIL NFR BLD: 4.7 % (ref 0–5)
ERYTHROCYTE [DISTWIDTH] IN BLOOD BY AUTOMATED COUNT: 13.9 % (ref 11.5–14.5)
GLUCOSE SERPL-MCNC: 152 MG/DL (ref 70–99)
HCT VFR BLD AUTO: 40.3 % (ref 42–52)
HGB BLD-MCNC: 14.4 G/DL (ref 14–18)
LYMPHOCYTES # BLD: 1.25 K/UL (ref 1.1–4.5)
LYMPHOCYTES NFR BLD: 19.1 % (ref 20–40)
MCH RBC QN AUTO: 29.4 PG (ref 27–31)
MCHC RBC AUTO-ENTMCNC: 35.7 G/DL (ref 33–37)
MCV RBC AUTO: 82.4 FL (ref 80–94)
MONOCYTES # BLD: 0.44 K/UL (ref 0–0.9)
MONOCYTES NFR BLD: 6.7 % (ref 1–10)
NEUTROPHILS # BLD: 4.51 K/UL (ref 1.5–7.5)
NEUTS SEG NFR BLD: 68.7 % (ref 50–65)
PLATELET # BLD AUTO: 205 K/UL (ref 130–400)
PMV BLD AUTO: 9.3 FL (ref 9.4–12.4)
POTASSIUM SERPL-SCNC: 4.9 MMOL/L (ref 3.5–5.1)
PROT SERPL-MCNC: 6.8 G/DL (ref 6.4–8.3)
PSA SERPL-MCNC: <0.01 NG/ML (ref 0–4)
RBC # BLD AUTO: 4.89 M/UL (ref 4.7–6.1)
SODIUM SERPL-SCNC: 136 MMOL/L (ref 136–145)
WBC # BLD AUTO: 6.56 K/UL (ref 4.8–10.8)

## 2025-01-17 PROCEDURE — 85025 COMPLETE CBC W/AUTO DIFF WBC: CPT

## 2025-01-17 PROCEDURE — 36415 COLL VENOUS BLD VENIPUNCTURE: CPT

## 2025-01-17 PROCEDURE — 80053 COMPREHEN METABOLIC PANEL: CPT

## 2025-01-17 PROCEDURE — 99212 OFFICE O/P EST SF 10 MIN: CPT

## 2025-01-17 RX ORDER — ACETAMINOPHEN 325 MG/1
650 TABLET ORAL EVERY 6 HOURS PRN
COMMUNITY

## 2025-01-20 ENCOUNTER — TELEPHONE (OUTPATIENT)
Dept: HEMATOLOGY | Age: 75
End: 2025-01-20

## 2025-01-20 NOTE — TELEPHONE ENCOUNTER
I have attempted to call patient with results of his PSA. I have provided results to patient and have requested for patient to contact our office if he any further questions or concerns.

## 2025-03-11 ENCOUNTER — OFFICE VISIT (OUTPATIENT)
Dept: FAMILY MEDICINE CLINIC | Facility: CLINIC | Age: 75
End: 2025-03-11
Payer: MEDICARE

## 2025-03-11 VITALS
SYSTOLIC BLOOD PRESSURE: 108 MMHG | HEART RATE: 67 BPM | BODY MASS INDEX: 26.4 KG/M2 | TEMPERATURE: 97.9 F | DIASTOLIC BLOOD PRESSURE: 61 MMHG | WEIGHT: 149 LBS | HEIGHT: 63 IN | OXYGEN SATURATION: 94 %

## 2025-03-11 DIAGNOSIS — M25.512 ACUTE PAIN OF LEFT SHOULDER: ICD-10-CM

## 2025-03-11 DIAGNOSIS — E11.65 TYPE 2 DIABETES MELLITUS WITH HYPERGLYCEMIA, WITHOUT LONG-TERM CURRENT USE OF INSULIN: Primary | Chronic | ICD-10-CM

## 2025-03-11 DIAGNOSIS — R05.1 ACUTE COUGH: ICD-10-CM

## 2025-03-11 LAB
EXPIRATION DATE: ABNORMAL
HBA1C MFR BLD: 7.5 % (ref 4.5–5.7)
Lab: ABNORMAL

## 2025-03-11 NOTE — PROGRESS NOTES
"Chief Complaint   Patient presents with    Hospital Follow Up Visit     Pt seen in  in [place] 2/27/25 for bacterial pnuemonia. Pt completed Augmentin and Doxy as prescribed. Pt is still having issues with not feeling well, coughing some, still has mucus, still not feeling well overall.     Shoulder Pain     Pt has c/o right shoulder pain that has been ongoing for about 4 days.         Subjective   William Mclean is a 74 y.o. male who presents today for the following:    HPI   Patient was diagnosed with Pneumonia at urgent care in [place]. He was prescribed 2 antibiotics, steroid, and tessalon. Patient states that he still has a cough .   Shoulder pain- left. Started 4-5 days ago. Arnica and tylenol otc with minimal relief. Movement makes the pain hurt worse. Patient denies injury.     Dm- has not been checking blood glucose at home. He has improved his diet.     No Known Allergies      OBJECTIVE:  Vitals:    03/11/25 1304   BP: 108/61   BP Location: Left arm   Patient Position: Sitting   Cuff Size: Adult   Pulse: 67   Temp: 97.9 °F (36.6 °C)   TempSrc: Infrared   SpO2: 94%   Weight: 67.6 kg (149 lb)   Height: 160 cm (63\")     Physical Exam  Vitals and nursing note reviewed.   Constitutional:       Appearance: Normal appearance.   Cardiovascular:      Rate and Rhythm: Normal rate and regular rhythm.   Pulmonary:      Breath sounds: Examination of the left-upper field reveals wheezing. Examination of the left-lower field reveals wheezing. Wheezing present. No rhonchi.   Abdominal:      General: Bowel sounds are normal.   Musculoskeletal:      Left shoulder: No swelling. Decreased range of motion.        Arms:       Comments: Area marked is tender on palpation.    Skin:     General: Skin is warm and dry.   Neurological:      Mental Status: He is alert and oriented to person, place, and time.   Psychiatric:         Mood and Affect: Mood normal.         Behavior: Behavior normal.         Thought Content: Thought " content normal.         Judgment: Judgment normal.                    ASSESSMENT/ PLAN:  Assessment & Plan  Type 2 diabetes mellitus with hyperglycemia, without long-term current use of insulin      Orders:    POC Glycosylated Hemoglobin (Hb A1C)    Acute cough    Orders:    XR Chest PA & Lateral    Acute pain of left shoulder              Procedures     Management Plan:     An After Visit Summary was printed and given to the patient at discharge.    Follow-up: Return if symptoms worsen or fail to improve.         Maddy Murphy, APRN 3/11/2025 16:05 CDT  This note was electronically signed.

## 2025-03-12 ENCOUNTER — TELEPHONE (OUTPATIENT)
Dept: GASTROENTEROLOGY | Age: 75
End: 2025-03-12

## 2025-03-12 NOTE — TELEPHONE ENCOUNTER
CX - PATIENT'S INSURANCE DENIED COVERAGE FOR CLN. Tracking #[id number]. they said that we could not schedule a peer to peer since it has alreay been denied. I tried to call the patIent - no answer - lm

## 2025-03-13 ENCOUNTER — TELEPHONE (OUTPATIENT)
Dept: FAMILY MEDICINE CLINIC | Facility: CLINIC | Age: 75
End: 2025-03-13
Payer: MEDICARE

## 2025-03-13 RX ORDER — METHYLPREDNISOLONE 4 MG/1
TABLET ORAL
Qty: 1 EACH | Refills: 0 | Status: SHIPPED | OUTPATIENT
Start: 2025-03-13

## 2025-03-13 NOTE — TELEPHONE ENCOUNTER
Caller: Rahel Mclean    Relationship: Emergency Contact    Best call back number:  [phone]    What medication are you requesting:     PCP RECOMMENDATION    FLONASE IS NOT REALLY WORKING.    What are your current symptoms:     RUNNY NOSE, POST NASAL DRIP, DRAINAGE    How long have you been experiencing symptoms:     SINCE LAST VISIT    Have you had these symptoms before:    [x] Yes  [] No    Have you been treated for these symptoms before:   [] Yes  [] No    If a prescription is needed, what is your preferred pharmacy and phone number:      [facility] [place], [place] - [address] [facility] [phone] [facility] [phone]

## 2025-04-01 DIAGNOSIS — E11.65 TYPE 2 DIABETES MELLITUS WITH HYPERGLYCEMIA, WITHOUT LONG-TERM CURRENT USE OF INSULIN: Chronic | ICD-10-CM

## 2025-04-02 RX ORDER — SEMAGLUTIDE 0.68 MG/ML
0.25 INJECTION, SOLUTION SUBCUTANEOUS WEEKLY
Qty: 3 ML | Refills: 0 | OUTPATIENT
Start: 2025-04-02

## 2025-04-02 RX ORDER — ROSUVASTATIN CALCIUM 40 MG/1
40 TABLET, COATED ORAL DAILY
Qty: 90 TABLET | Refills: 0 | Status: SHIPPED | OUTPATIENT
Start: 2025-04-02

## 2025-04-02 RX ORDER — METFORMIN HYDROCHLORIDE 750 MG/1
750 TABLET, EXTENDED RELEASE ORAL
Qty: 90 TABLET | Refills: 0 | Status: SHIPPED | OUTPATIENT
Start: 2025-04-02

## 2025-04-08 ENCOUNTER — PROCEDURE VISIT (OUTPATIENT)
Dept: FAMILY MEDICINE CLINIC | Facility: CLINIC | Age: 75
End: 2025-04-08
Payer: MEDICARE

## 2025-04-08 VITALS
WEIGHT: 147.8 LBS | SYSTOLIC BLOOD PRESSURE: 128 MMHG | OXYGEN SATURATION: 97 % | DIASTOLIC BLOOD PRESSURE: 73 MMHG | RESPIRATION RATE: 17 BRPM | HEART RATE: 75 BPM | HEIGHT: 63 IN | BODY MASS INDEX: 26.19 KG/M2 | TEMPERATURE: 98.1 F

## 2025-04-08 DIAGNOSIS — H61.23 BILATERAL HEARING LOSS DUE TO CERUMEN IMPACTION: ICD-10-CM

## 2025-04-08 DIAGNOSIS — E11.65 TYPE 2 DIABETES MELLITUS WITH HYPERGLYCEMIA, WITHOUT LONG-TERM CURRENT USE OF INSULIN: Primary | Chronic | ICD-10-CM

## 2025-04-08 PROCEDURE — 99213 OFFICE O/P EST LOW 20 MIN: CPT | Performed by: NURSE PRACTITIONER

## 2025-04-08 RX ORDER — BUDESONIDE, GLYCOPYRROLATE, AND FORMOTEROL FUMARATE 160; 9; 4.8 UG/1; UG/1; UG/1
2 AEROSOL, METERED RESPIRATORY (INHALATION) 2 TIMES DAILY
COMMUNITY
Start: 2025-04-01

## 2025-04-08 RX ORDER — ONDANSETRON 4 MG/1
4 TABLET, FILM COATED ORAL EVERY 8 HOURS PRN
COMMUNITY
Start: 2025-03-17

## 2025-04-08 NOTE — PROGRESS NOTES
"Chief Complaint   Patient presents with    Ear Fullness     Pt is c/o ear fullness, R      , would like to discuss possible ear lavage    Med Refill        Subjective   William Mclean is a 74 y.o. male who presents today for the following:    HPI     Difficulty hearing- patient has been using debrox ear drops without any relief. Patient denies any pain.      Type 2 diabetes- patient is due for microalbumin.     No Known Allergies      OBJECTIVE:  Vitals:    04/08/25 1318   BP: 128/73   BP Location: Left arm   Patient Position: Sitting   Cuff Size: Adult   Pulse: 75   Resp: 17   Temp: 98.1 °F (36.7 °C)   TempSrc: Infrared   SpO2: 97%   Weight: 67 kg (147 lb 12.8 oz)   Height: 160 cm (63\")     Physical Exam  Vitals and nursing note reviewed.   Constitutional:       Appearance: Normal appearance.   HENT:      Right Ear: There is impacted cerumen.      Left Ear: There is impacted cerumen.   Cardiovascular:      Rate and Rhythm: Normal rate and regular rhythm.   Pulmonary:      Effort: Pulmonary effort is normal.      Breath sounds: Normal breath sounds.   Neurological:      Mental Status: He is alert.                    ASSESSMENT/ PLAN:  Assessment & Plan  Type 2 diabetes mellitus with hyperglycemia, without long-term current use of insulin      Orders:    Microalbumin / Creatinine Urine Ratio - Urine, Clean Catch    Bilateral hearing loss due to cerumen impaction              Ear Cerumen Removal    Date/Time: 4/9/2025 8:43 AM    Performed by: Maddy Murphy APRN  Authorized by: Maddy Murphy APRN  Location details: left ear and right ear  Patient tolerance: patient tolerated the procedure well with no immediate complications  Comments: Bilateral Tms clear after irrigation.   Procedure type: instrumentation, irrigation          Management Plan:     An After Visit Summary was printed and given to the patient at discharge.    Follow-up: Return in about 1 month (around 5/8/2025) for A1C.         Maddy Chaparro" LEANDRA Murphy 4/9/2025 08:44 CDT  This note was electronically signed.

## 2025-04-09 LAB
ALBUMIN/CREAT UR: 360 MG/G CREAT (ref 0–29)
CREAT UR-MCNC: 34 MG/DL
MICROALBUMIN UR-MCNC: 122.4 UG/ML

## 2025-04-09 PROCEDURE — 69210 REMOVE IMPACTED EAR WAX UNI: CPT | Performed by: NURSE PRACTITIONER

## 2025-04-21 ENCOUNTER — OFFICE VISIT (OUTPATIENT)
Dept: FAMILY MEDICINE CLINIC | Facility: CLINIC | Age: 75
End: 2025-04-21
Payer: MEDICARE

## 2025-04-21 VITALS
BODY MASS INDEX: 27.29 KG/M2 | SYSTOLIC BLOOD PRESSURE: 149 MMHG | OXYGEN SATURATION: 94 % | WEIGHT: 154 LBS | DIASTOLIC BLOOD PRESSURE: 82 MMHG | TEMPERATURE: 97.9 F | HEART RATE: 65 BPM | HEIGHT: 63 IN | RESPIRATION RATE: 18 BRPM

## 2025-04-21 DIAGNOSIS — I10 PRIMARY HYPERTENSION: Chronic | ICD-10-CM

## 2025-04-21 DIAGNOSIS — K21.9 GASTROESOPHAGEAL REFLUX DISEASE WITHOUT ESOPHAGITIS: Chronic | ICD-10-CM

## 2025-04-21 DIAGNOSIS — R42 DIZZINESS: ICD-10-CM

## 2025-04-21 DIAGNOSIS — R53.83 OTHER FATIGUE: ICD-10-CM

## 2025-04-21 DIAGNOSIS — R53.1 WEAKNESS GENERALIZED: ICD-10-CM

## 2025-04-21 DIAGNOSIS — E83.42 HYPOMAGNESEMIA: Chronic | ICD-10-CM

## 2025-04-21 DIAGNOSIS — E78.1 HYPERTRIGLYCERIDEMIA: Chronic | ICD-10-CM

## 2025-04-21 DIAGNOSIS — E55.9 VITAMIN D DEFICIENCY: Chronic | ICD-10-CM

## 2025-04-21 DIAGNOSIS — E11.65 TYPE 2 DIABETES MELLITUS WITH HYPERGLYCEMIA, WITHOUT LONG-TERM CURRENT USE OF INSULIN: Primary | Chronic | ICD-10-CM

## 2025-04-21 PROCEDURE — 99214 OFFICE O/P EST MOD 30 MIN: CPT | Performed by: NURSE PRACTITIONER

## 2025-04-21 PROCEDURE — 1126F AMNT PAIN NOTED NONE PRSNT: CPT | Performed by: NURSE PRACTITIONER

## 2025-04-21 PROCEDURE — 1159F MED LIST DOCD IN RCRD: CPT | Performed by: NURSE PRACTITIONER

## 2025-04-21 PROCEDURE — 3077F SYST BP >= 140 MM HG: CPT | Performed by: NURSE PRACTITIONER

## 2025-04-21 PROCEDURE — 3051F HG A1C>EQUAL 7.0%<8.0%: CPT | Performed by: NURSE PRACTITIONER

## 2025-04-21 PROCEDURE — 3078F DIAST BP <80 MM HG: CPT | Performed by: NURSE PRACTITIONER

## 2025-04-21 PROCEDURE — 1160F RVW MEDS BY RX/DR IN RCRD: CPT | Performed by: NURSE PRACTITIONER

## 2025-04-21 RX ORDER — CARVEDILOL 25 MG/1
25 TABLET ORAL 2 TIMES DAILY
Qty: 180 TABLET | Refills: 3 | Status: SHIPPED | OUTPATIENT
Start: 2025-04-21

## 2025-04-21 RX ORDER — ELECTROLYTES/DEXTROSE
1 SOLUTION, ORAL ORAL 2 TIMES DAILY
Qty: 180 TABLET | Refills: 3 | Status: SHIPPED | OUTPATIENT
Start: 2025-04-21

## 2025-04-21 RX ORDER — GLIMEPIRIDE 4 MG/1
4 TABLET ORAL
Qty: 180 TABLET | Refills: 3 | Status: SHIPPED | OUTPATIENT
Start: 2025-04-21

## 2025-04-21 RX ORDER — METFORMIN HYDROCHLORIDE 750 MG/1
750 TABLET, EXTENDED RELEASE ORAL
Qty: 90 TABLET | Refills: 3 | Status: SHIPPED | OUTPATIENT
Start: 2025-04-21

## 2025-04-21 RX ORDER — LISINOPRIL 20 MG/1
20 TABLET ORAL DAILY
Qty: 90 TABLET | Refills: 3 | Status: SHIPPED | OUTPATIENT
Start: 2025-04-21

## 2025-04-21 RX ORDER — ROSUVASTATIN CALCIUM 40 MG/1
40 TABLET, COATED ORAL DAILY
Qty: 90 TABLET | Refills: 3 | Status: SHIPPED | OUTPATIENT
Start: 2025-04-21

## 2025-04-21 RX ORDER — ASPIRIN 81 MG/1
81 TABLET, CHEWABLE ORAL DAILY
Qty: 90 TABLET | Refills: 3 | Status: SHIPPED | OUTPATIENT
Start: 2025-04-21

## 2025-04-21 RX ORDER — OMEPRAZOLE 20 MG/1
20 CAPSULE, DELAYED RELEASE ORAL 2 TIMES DAILY
Qty: 180 CAPSULE | Refills: 3 | Status: SHIPPED | OUTPATIENT
Start: 2025-04-21

## 2025-04-21 NOTE — PROGRESS NOTES
"Chief Complaint   Patient presents with    Discuss Medication Refills     Pt states he would not like to get his medications through [employer] anymore because he has been having issues with them notifying his provider when they are due for refills. Pt also states he gets lethargic quickly and wants to make sure he should still be taking all his medications.         Subjective   William Mclean is a 74 y.o. male who presents today for the following:    HPI   Fatigue- occurring everyday.generalized weakness. Denies one sided weakness.He states that he does get dizzy at times too. Denies chest pain, occasionally he has left arm pain. He does have history of heart issues. He has not seen a cardiologist in years. He is due for an eye exam.   He denies headaches. History of chemo, radiation and hormone therapy.     Colon cancer screening- next week.   Back pain- Patient also has history of chronic back pain. He has had a recent x-ray and his previous PCP was going to order a MRI.     No Known Allergies      OBJECTIVE:  Vitals:    04/21/25 1254 04/21/25 1258   BP: 156/74 149/82   BP Location: Left arm Right arm   Patient Position: Sitting Sitting   Cuff Size: Adult Adult   Pulse: 65    Resp: 18    Temp: 97.9 °F (36.6 °C)    TempSrc: Temporal    SpO2: 94%    Weight: 69.9 kg (154 lb)    Height: 160 cm (63\")      Physical Exam  Vitals and nursing note reviewed.   Constitutional:       Appearance: Normal appearance.   Cardiovascular:      Rate and Rhythm: Normal rate and regular rhythm.   Pulmonary:      Breath sounds: Normal breath sounds. No wheezing or rhonchi.   Abdominal:      General: Bowel sounds are normal.   Musculoskeletal:         General: Normal range of motion.   Skin:     General: Skin is warm and dry.   Neurological:      Mental Status: He is alert and oriented to person, place, and time.   Psychiatric:         Mood and Affect: Mood normal.         Behavior: Behavior normal.                    ASSESSMENT/ " PLAN:  Assessment & Plan  Type 2 diabetes mellitus with hyperglycemia, without long-term current use of insulin      Orders:    glimepiride (AMARYL) 4 MG tablet; Take 1 tablet by mouth Every Morning Before Breakfast.    metFORMIN ER (GLUCOPHAGE-XR) 750 MG 24 hr tablet; Take 1 tablet by mouth Daily With Breakfast.    empagliflozin (JARDIANCE) 25 MG tablet tablet; Take 1 tablet by mouth Daily.    aspirin 81 MG chewable tablet; Chew 1 tablet Daily.    Vitamin B12    TSH Rfx On Abnormal To Free T4    Magnesium    Iron and TIBC    Folate    Ferritin    Comprehensive Metabolic Panel    CBC & Differential    Primary hypertension      Orders:    lisinopril (PRINIVIL,ZESTRIL) 20 MG tablet; Take 1 tablet by mouth Daily.    carvedilol (COREG) 25 MG tablet; Take 1 tablet by mouth 2 (Two) Times a Day.    aspirin 81 MG chewable tablet; Chew 1 tablet Daily.    Vitamin B12    TSH Rfx On Abnormal To Free T4    Magnesium    Iron and TIBC    Folate    Ferritin    Comprehensive Metabolic Panel    CBC & Differential    Hypertriglyceridemia       Orders:    rosuvastatin (CRESTOR) 40 MG tablet; Take 1 tablet by mouth Daily.    Hypomagnesemia    Orders:    Magnesium Chloride 64 MG tablet; Take 1 tablet by mouth 2 (Two) Times a Day.    Magnesium    Gastroesophageal reflux disease without esophagitis    Orders:    omeprazole (priLOSEC) 20 MG capsule; Take 1 capsule by mouth 2 (Two) Times a Day.    Other fatigue  After labs will consider sleep study. Patient has been referred to cardiology for further testing.   Orders:    Ambulatory Referral to Cardiology    Vitamin D 25 hydroxy    Vitamin B12    TSH Rfx On Abnormal To Free T4    Iron and TIBC    Folate    Ferritin    Comprehensive Metabolic Panel    CBC & Differential    Weakness generalized    Orders:    Ambulatory Referral to Physical Therapy for Evaluation & Treatment    Ambulatory Referral to Cardiology    Dizziness  Encourage eye exam. Referred him to physical therapy.   Orders:     Ambulatory Referral to Physical Therapy for Evaluation & Treatment    Vitamin D deficiency    Orders:    Vitamin D 25 hydroxy         Procedures     Management Plan:     An After Visit Summary was printed and given to the patient at discharge.    Follow-up: Return in about 2 weeks (around 5/5/2025) for follow up on fatigue .         Maddy Murphy, APRN 4/21/2025 14:08 CDT  This note was electronically signed.

## 2025-04-21 NOTE — ASSESSMENT & PLAN NOTE
Orders:    glimepiride (AMARYL) 4 MG tablet; Take 1 tablet by mouth Every Morning Before Breakfast.    metFORMIN ER (GLUCOPHAGE-XR) 750 MG 24 hr tablet; Take 1 tablet by mouth Daily With Breakfast.    empagliflozin (JARDIANCE) 25 MG tablet tablet; Take 1 tablet by mouth Daily.    aspirin 81 MG chewable tablet; Chew 1 tablet Daily.    Vitamin B12    TSH Rfx On Abnormal To Free T4    Magnesium    Iron and TIBC    Folate    Ferritin    Comprehensive Metabolic Panel    CBC & Differential

## 2025-04-21 NOTE — ASSESSMENT & PLAN NOTE
Orders:    Magnesium Chloride 64 MG tablet; Take 1 tablet by mouth 2 (Two) Times a Day.    Magnesium

## 2025-04-21 NOTE — ASSESSMENT & PLAN NOTE
Orders:    lisinopril (PRINIVIL,ZESTRIL) 20 MG tablet; Take 1 tablet by mouth Daily.    carvedilol (COREG) 25 MG tablet; Take 1 tablet by mouth 2 (Two) Times a Day.    aspirin 81 MG chewable tablet; Chew 1 tablet Daily.    Vitamin B12    TSH Rfx On Abnormal To Free T4    Magnesium    Iron and TIBC    Folate    Ferritin    Comprehensive Metabolic Panel    CBC & Differential

## 2025-04-23 LAB
25(OH)D3+25(OH)D2 SERPL-MCNC: 38.2 NG/ML (ref 30–100)
ALBUMIN SERPL-MCNC: 4.4 G/DL (ref 3.8–4.8)
ALP SERPL-CCNC: 143 IU/L (ref 44–121)
ALT SERPL-CCNC: 22 IU/L (ref 0–44)
AST SERPL-CCNC: 17 IU/L (ref 0–40)
BASOPHILS # BLD AUTO: 0 X10E3/UL (ref 0–0.2)
BASOPHILS NFR BLD AUTO: 1 %
BILIRUB SERPL-MCNC: 0.4 MG/DL (ref 0–1.2)
BUN SERPL-MCNC: 16 MG/DL (ref 8–27)
BUN/CREAT SERPL: 19 (ref 10–24)
CALCIUM SERPL-MCNC: 10 MG/DL (ref 8.6–10.2)
CHLORIDE SERPL-SCNC: 98 MMOL/L (ref 96–106)
CO2 SERPL-SCNC: 21 MMOL/L (ref 20–29)
CREAT SERPL-MCNC: 0.83 MG/DL (ref 0.76–1.27)
EGFRCR SERPLBLD CKD-EPI 2021: 93 ML/MIN/1.73
EOSINOPHIL # BLD AUTO: 0.2 X10E3/UL (ref 0–0.4)
EOSINOPHIL NFR BLD AUTO: 3 %
ERYTHROCYTE [DISTWIDTH] IN BLOOD BY AUTOMATED COUNT: 15.7 % (ref 11.6–15.4)
FERRITIN SERPL-MCNC: 317 NG/ML (ref 30–400)
FOLATE SERPL-MCNC: 9.3 NG/ML
GLOBULIN SER CALC-MCNC: 2.4 G/DL (ref 1.5–4.5)
GLUCOSE SERPL-MCNC: 335 MG/DL (ref 70–99)
HCT VFR BLD AUTO: 44.6 % (ref 37.5–51)
HGB BLD-MCNC: 14.1 G/DL (ref 13–17.7)
IMM GRANULOCYTES # BLD AUTO: 0 X10E3/UL (ref 0–0.1)
IMM GRANULOCYTES NFR BLD AUTO: 0 %
IRON SATN MFR SERPL: 33 % (ref 15–55)
IRON SERPL-MCNC: 97 UG/DL (ref 38–169)
LYMPHOCYTES # BLD AUTO: 1.5 X10E3/UL (ref 0.7–3.1)
LYMPHOCYTES NFR BLD AUTO: 22 %
MAGNESIUM SERPL-MCNC: 1.5 MG/DL (ref 1.6–2.3)
MCH RBC QN AUTO: 28.8 PG (ref 26.6–33)
MCHC RBC AUTO-ENTMCNC: 31.6 G/DL (ref 31.5–35.7)
MCV RBC AUTO: 91 FL (ref 79–97)
MONOCYTES # BLD AUTO: 0.5 X10E3/UL (ref 0.1–0.9)
MONOCYTES NFR BLD AUTO: 7 %
NEUTROPHILS # BLD AUTO: 4.6 X10E3/UL (ref 1.4–7)
NEUTROPHILS NFR BLD AUTO: 67 %
PLATELET # BLD AUTO: 208 X10E3/UL (ref 150–450)
POTASSIUM SERPL-SCNC: 5.4 MMOL/L (ref 3.5–5.2)
PROT SERPL-MCNC: 6.8 G/DL (ref 6–8.5)
RBC # BLD AUTO: 4.9 X10E6/UL (ref 4.14–5.8)
SODIUM SERPL-SCNC: 135 MMOL/L (ref 134–144)
TIBC SERPL-MCNC: 296 UG/DL (ref 250–450)
TSH SERPL DL<=0.005 MIU/L-ACNC: 2.1 UIU/ML (ref 0.45–4.5)
UIBC SERPL-MCNC: 199 UG/DL (ref 111–343)
VIT B12 SERPL-MCNC: 578 PG/ML (ref 232–1245)
WBC # BLD AUTO: 6.9 X10E3/UL (ref 3.4–10.8)

## 2025-04-28 ENCOUNTER — ANESTHESIA EVENT (OUTPATIENT)
Dept: OPERATING ROOM | Age: 75
End: 2025-04-28

## 2025-04-28 ENCOUNTER — APPOINTMENT (OUTPATIENT)
Dept: OPERATING ROOM | Age: 75
End: 2025-04-28
Attending: INTERNAL MEDICINE

## 2025-04-28 ENCOUNTER — ANESTHESIA (OUTPATIENT)
Dept: OPERATING ROOM | Age: 75
End: 2025-04-28

## 2025-04-28 ENCOUNTER — HOSPITAL ENCOUNTER (OUTPATIENT)
Age: 75
Setting detail: OUTPATIENT SURGERY
Discharge: HOME | End: 2025-04-28
Attending: INTERNAL MEDICINE | Admitting: INTERNAL MEDICINE

## 2025-04-28 VITALS
SYSTOLIC BLOOD PRESSURE: 139 MMHG | HEIGHT: 65 IN | DIASTOLIC BLOOD PRESSURE: 71 MMHG | OXYGEN SATURATION: 94 % | BODY MASS INDEX: 25.16 KG/M2 | RESPIRATION RATE: 18 BRPM | HEART RATE: 75 BPM | WEIGHT: 151 LBS | TEMPERATURE: 97.2 F

## 2025-04-28 PROCEDURE — G0105 COLORECTAL SCRN; HI RISK IND: HCPCS

## 2025-04-28 RX ORDER — LIDOCAINE HYDROCHLORIDE 10 MG/ML
INJECTION, SOLUTION INFILTRATION; PERINEURAL
Status: DISCONTINUED | OUTPATIENT
Start: 2025-04-28 | End: 2025-04-28 | Stop reason: SDUPTHER

## 2025-04-28 RX ORDER — PROPOFOL 10 MG/ML
INJECTION, EMULSION INTRAVENOUS
Status: DISCONTINUED | OUTPATIENT
Start: 2025-04-28 | End: 2025-04-28 | Stop reason: SDUPTHER

## 2025-04-28 RX ORDER — SODIUM CHLORIDE, SODIUM LACTATE, POTASSIUM CHLORIDE, CALCIUM CHLORIDE 600; 310; 30; 20 MG/100ML; MG/100ML; MG/100ML; MG/100ML
INJECTION, SOLUTION INTRAVENOUS CONTINUOUS
Status: DISCONTINUED | OUTPATIENT
Start: 2025-04-28 | End: 2025-04-28 | Stop reason: HOSPADM

## 2025-04-28 RX ADMIN — PROPOFOL 10 MG: 10 INJECTION, EMULSION INTRAVENOUS at 11:33

## 2025-04-28 RX ADMIN — SODIUM CHLORIDE, SODIUM LACTATE, POTASSIUM CHLORIDE, CALCIUM CHLORIDE: 600; 310; 30; 20 INJECTION, SOLUTION INTRAVENOUS at 10:02

## 2025-04-28 RX ADMIN — LIDOCAINE HYDROCHLORIDE 60 MG: 10 INJECTION, SOLUTION INFILTRATION; PERINEURAL at 11:33

## 2025-04-28 ASSESSMENT — PAIN - FUNCTIONAL ASSESSMENT: PAIN_FUNCTIONAL_ASSESSMENT: NONE - DENIES PAIN

## 2025-04-28 NOTE — ANESTHESIA POSTPROCEDURE EVALUATION
Department of Anesthesiology  Postprocedure Note    Patient: Angelo Mauro  MRN: [id number]  YOB: 1950  Date of evaluation: 4/28/2025    Procedure Summary       Date: 04/28/25 Room / Location: [place] [address] / [facility] [place]    Anesthesia Start: 1132 Anesthesia Stop:     Procedure: COLORECTAL CANCER SCREENING, NOT HIGH RISK (Abdomen) Diagnosis:       Screen for colon cancer      Hx of colonic polyps      (Screen for colon cancer [Z12.11])      (Hx of colonic polyps [Z86.0100])    Surgeons: Perfecto Jenkins MD Responsible Provider: Tila Flynn APRN - CRNA    Anesthesia Type: general, TIVA ASA Status: 2            Anesthesia Type: No value filed.    Cass Phase I:      Cass Phase II:      Anesthesia Post Evaluation    Patient location during evaluation: bedside  Patient participation: waiting for patient participation  Level of consciousness: sleepy but conscious  Pain score: 0  Airway patency: patent  Nausea & Vomiting: no nausea and no vomiting  Cardiovascular status: hemodynamically stable  Respiratory status: acceptable and spontaneous ventilation  Hydration status: stable  Comments: BP (!) 142/60   Pulse 75   Temp 97.4 °F (36.3 °C)   Resp 16   Ht 1.651 m (5' 5\")   Wt 68.5 kg (151 lb)   SpO2 97%   BMI 25.13 kg/m²     Pain management: adequate    No notable events documented.

## 2025-04-28 NOTE — OP NOTE
possible from the bowel. The colonoscope was removed from the patient, and the procedure was terminated.  Findings are listed below.        Findings:     The mucosa appeared normal throughout the entire examined colon.    Retroflexion in the rectum was normal and revealed no further abnormalities.    Recommendations:  1. Repeat colonoscopy: 5 years, due to hx of colon polyps.    Findings and recommendations were discussed w/ the patient. A copy of the images was provided.    IMegan, trenton scribing for and in the presence of Dr. Perfecto Jenkins MD.  Electronically signed by Megan Ríos RN on 4/28/2025 at 11:43 AM    I personally performed the services described in this documentation as scribed by Megan Ríos, and it appears accurate and complete.     Perfecto Jenkins MD  4/28/2025

## 2025-04-28 NOTE — ANESTHESIA PRE PROCEDURE
Department of Anesthesiology  Preprocedure Note       Name:  Angelo Mauro   Age:  74 y.o.  :  1950                                          MRN:  [id number]         Date:  2025      Surgeon: Surgeon(s):  Perfecto Jenkins MD    Procedure: Procedure(s):  COLORECTAL CANCER SCREENING, NOT HIGH RISK    Medications prior to admission:   Prior to Admission medications    Medication Sig Start Date End Date Taking? Authorizing Provider   acetaminophen (TYLENOL) 325 MG tablet Take 2 tablets by mouth every 6 hours as needed for Pain   Yes Stewart Jordan MD   carvedilol (COREG) 25 MG tablet Take 1 tablet by mouth 2 times daily 5/3/19  Yes Stewart Jordan MD   omeprazole (PRILOSEC) 20 MG delayed release capsule Take 1 capsule by mouth 10/24/23  Yes Stewart Jordan MD   ibuprofen (ADVIL;MOTRIN) 200 MG tablet Take 2 tablets by mouth every 6 hours as needed for Pain   Yes Stewart Jordan MD   aspirin 81 MG tablet Take 1 tablet by mouth daily   Yes Stewart Jordan MD   empagliflozin (JARDIANCE) 25 MG tablet Take 1 tablet by mouth daily    Stewart Jordan MD   atorvastatin (LIPITOR) 80 MG tablet Take 1 tablet by mouth daily    Stewart Jordan MD   Magnesium Chloride 64 MG TABS Take by mouth in the morning and at bedtime    Stewart Jordan MD   NONFORMULARY Sleep aide nightly  Patient not taking: Reported on 2024    Stewart Jordan MD   loperamide (IMODIUM) 2 MG capsule Take 1 capsule by mouth 4 times daily as needed for Diarrhea    Stewart Jordan MD   oxyCODONE-acetaminophen (PERCOCET) 5-325 MG per tablet Take 1 tablet by mouth every 6 hours as needed for Pain.  Patient not taking: Reported on 2023    Stewart Jordan MD   lisinopril (PRINIVIL;ZESTRIL) 20 MG tablet Take 1 tablet by mouth daily    Stewart Jordan MD   metFORMIN (GLUCOPHAGE) 1000 MG tablet Take 750 mg by mouth daily (with breakfast)    Stewart Jordan MD   doxazosin

## 2025-04-28 NOTE — H&P
Patient Name: Angelo Mauro  : 1950  MRN: [id number]  DATE: 25    Allergies: No Known Allergies     ENDOSCOPY  History and Physical    Procedure:    [] Diagnostic Colonoscopy       [x] Screening Colonoscopy  [] EGD      [] ERCP      [] EUS       [] Other    [x] Previous office notes/History and Physical reviewed from the patients chart. Please see EMR for further details of HPI. I have examined the patient's status immediately prior to the procedure and:      Indications/HPI:       [x] Screening              [x] History of Polyps      [x]Fhx of colon CA/polyps []+Cologard/DNA/Stool Testing      Anesthesia:   [x] MAC [] Moderate Sedation   [] General   [] None     ROS: 12 pt review of systems was negative unless stated above    Medications:   Prior to Admission medications    Medication Sig Start Date End Date Taking? Authorizing Provider   acetaminophen (TYLENOL) 325 MG tablet Take 2 tablets by mouth every 6 hours as needed for Pain   Yes Stewart Jordan MD   carvedilol (COREG) 25 MG tablet Take 1 tablet by mouth 2 times daily 5/3/19  Yes Stewart Jordan MD   omeprazole (PRILOSEC) 20 MG delayed release capsule Take 1 capsule by mouth 10/24/23  Yes Stewart Jordan MD   ibuprofen (ADVIL;MOTRIN) 200 MG tablet Take 2 tablets by mouth every 6 hours as needed for Pain   Yes Stewart Jordan MD   aspirin 81 MG tablet Take 1 tablet by mouth daily   Yes Stewart Jordan MD   empagliflozin (JARDIANCE) 25 MG tablet Take 1 tablet by mouth daily    Stewart Jordan MD   atorvastatin (LIPITOR) 80 MG tablet Take 1 tablet by mouth daily    Stewart Jordan MD   Magnesium Chloride 64 MG TABS Take by mouth in the morning and at bedtime    Stewart Jordan MD   NONFORMULARY Sleep aide nightly  Patient not taking: Reported on 2024    Stewart Jordan MD   loperamide (IMODIUM) 2 MG capsule Take 1 capsule by mouth 4 times daily as needed for Diarrhea    Provider

## 2025-04-28 NOTE — DISCHARGE INSTRUCTIONS
Recommendations:  1. Repeat colonoscopy: 5 years, due to hx of colon polyps.    POST-OP ORDERS: ENDOSCOPY & COLONOSCOPY:    1. Rest today.    2. DO NOT eat or drink until wide awake; eat your usual diet today in moderate amount only.    3. DO NOT drive today.    4. Call physician if you have severe pain, vomiting, fever, rectal bleeding or black bowel movements.    5.  If a biopsy was taken or a polyp removed, you should expect to hear results in about 21 days.  If you have heard nothing from your physician by then, call the office for results.    6.  Discharge home when patient awake, vitals signs stable and tolerating liquids.

## 2025-05-02 ENCOUNTER — OFFICE VISIT (OUTPATIENT)
Dept: FAMILY MEDICINE CLINIC | Facility: CLINIC | Age: 75
End: 2025-05-02
Payer: MEDICARE

## 2025-05-02 VITALS
HEIGHT: 63 IN | HEART RATE: 67 BPM | SYSTOLIC BLOOD PRESSURE: 128 MMHG | DIASTOLIC BLOOD PRESSURE: 73 MMHG | BODY MASS INDEX: 26.08 KG/M2 | WEIGHT: 147.2 LBS | RESPIRATION RATE: 15 BRPM | OXYGEN SATURATION: 97 % | TEMPERATURE: 98.2 F

## 2025-05-02 DIAGNOSIS — R42 VERTIGO: ICD-10-CM

## 2025-05-02 DIAGNOSIS — R26.89 SHUFFLING GAIT: ICD-10-CM

## 2025-05-02 DIAGNOSIS — E87.5 HYPERKALEMIA: ICD-10-CM

## 2025-05-02 DIAGNOSIS — E83.42 HYPOMAGNESEMIA: Primary | ICD-10-CM

## 2025-05-02 DIAGNOSIS — R53.83 OTHER FATIGUE: ICD-10-CM

## 2025-05-02 PROCEDURE — 3051F HG A1C>EQUAL 7.0%<8.0%: CPT | Performed by: NURSE PRACTITIONER

## 2025-05-02 PROCEDURE — 3078F DIAST BP <80 MM HG: CPT | Performed by: NURSE PRACTITIONER

## 2025-05-02 PROCEDURE — 1160F RVW MEDS BY RX/DR IN RCRD: CPT | Performed by: NURSE PRACTITIONER

## 2025-05-02 PROCEDURE — 1126F AMNT PAIN NOTED NONE PRSNT: CPT | Performed by: NURSE PRACTITIONER

## 2025-05-02 PROCEDURE — 99213 OFFICE O/P EST LOW 20 MIN: CPT | Performed by: NURSE PRACTITIONER

## 2025-05-02 PROCEDURE — 1159F MED LIST DOCD IN RCRD: CPT | Performed by: NURSE PRACTITIONER

## 2025-05-02 PROCEDURE — 3074F SYST BP LT 130 MM HG: CPT | Performed by: NURSE PRACTITIONER

## 2025-05-02 NOTE — PROGRESS NOTES
"Chief Complaint   Patient presents with    Diabetes     Lab recheck        Subjective   William Mclean is a 74 y.o. male who presents today for the following     HPI   High potassium and low magnesium in previous lab work.   Shuffling gait and fatigue, and change in vision at times. Patient has appt with Cardiologist on Tuesday.  Wife states that he is pale at times and is shuffling his feet when walks. Patient does have a history of cardiac stents.     No Known Allergies      OBJECTIVE:  Vitals:    05/02/25 1030   BP: 128/73   BP Location: Left arm   Patient Position: Sitting   Cuff Size: Adult   Pulse: 67   Resp: 15   Temp: 98.2 °F (36.8 °C)   TempSrc: Infrared   SpO2: 97%   Weight: 66.8 kg (147 lb 3.2 oz)   Height: 160 cm (63\")     Physical Exam  Vitals and nursing note reviewed.   Constitutional:       Appearance: Normal appearance.   HENT:      Right Ear: Tympanic membrane normal.      Left Ear: Tympanic membrane normal.   Cardiovascular:      Rate and Rhythm: Normal rate and regular rhythm.   Pulmonary:      Effort: Pulmonary effort is normal.      Breath sounds: Normal breath sounds.   Neurological:      Mental Status: He is alert.   Psychiatric:         Behavior: Behavior normal.             ASSESSMENT/ PLAN:    Diagnoses and all orders for this visit:    1. Hypomagnesemia (Primary)  -     Magnesium    2. Hyperkalemia  -     Comprehensive metabolic panel    3. Shuffling gait  -     Ambulatory Referral to Neurology    4. Other fatigue  -     Ambulatory Referral to Neurology    5. Vertigo  -     Ambulatory Referral to Neurology      Procedures     Advised patient to make sure he goes to cardiologist appt.     Follow-up: Return in about 1 month (around 6/2/2025) for A1C.      Maddy Murphy, LEANDRA 5/2/2025 11:05 CDT  This note was electronically signed.          "

## 2025-05-03 LAB
ALBUMIN SERPL-MCNC: 4.4 G/DL (ref 3.8–4.8)
ALP SERPL-CCNC: 108 IU/L (ref 44–121)
ALT SERPL-CCNC: 21 IU/L (ref 0–44)
AST SERPL-CCNC: 15 IU/L (ref 0–40)
BILIRUB SERPL-MCNC: 0.3 MG/DL (ref 0–1.2)
BUN SERPL-MCNC: 23 MG/DL (ref 8–27)
BUN/CREAT SERPL: 27 (ref 10–24)
CALCIUM SERPL-MCNC: 10 MG/DL (ref 8.6–10.2)
CHLORIDE SERPL-SCNC: 100 MMOL/L (ref 96–106)
CO2 SERPL-SCNC: 18 MMOL/L (ref 20–29)
CREAT SERPL-MCNC: 0.86 MG/DL (ref 0.76–1.27)
EGFRCR SERPLBLD CKD-EPI 2021: 92 ML/MIN/1.73
GLOBULIN SER CALC-MCNC: 2.6 G/DL (ref 1.5–4.5)
GLUCOSE SERPL-MCNC: 238 MG/DL (ref 70–99)
MAGNESIUM SERPL-MCNC: 1.5 MG/DL (ref 1.6–2.3)
POTASSIUM SERPL-SCNC: 4.8 MMOL/L (ref 3.5–5.2)
PROT SERPL-MCNC: 7 G/DL (ref 6–8.5)
SODIUM SERPL-SCNC: 136 MMOL/L (ref 134–144)

## 2025-05-06 ENCOUNTER — OFFICE VISIT (OUTPATIENT)
Dept: CARDIOLOGY CLINIC | Age: 75
End: 2025-05-06
Payer: MEDICARE

## 2025-05-06 VITALS
HEART RATE: 69 BPM | OXYGEN SATURATION: 98 % | BODY MASS INDEX: 24.83 KG/M2 | HEIGHT: 65 IN | SYSTOLIC BLOOD PRESSURE: 120 MMHG | DIASTOLIC BLOOD PRESSURE: 60 MMHG | WEIGHT: 149 LBS

## 2025-05-06 DIAGNOSIS — R53.83 OTHER FATIGUE: Primary | ICD-10-CM

## 2025-05-06 DIAGNOSIS — R07.9 CHEST PAIN, UNSPECIFIED TYPE: ICD-10-CM

## 2025-05-06 DIAGNOSIS — R06.02 SHORTNESS OF BREATH: ICD-10-CM

## 2025-05-06 DIAGNOSIS — R94.31 ABNORMAL ELECTROCARDIOGRAPHY: ICD-10-CM

## 2025-05-06 DIAGNOSIS — I25.10 CORONARY ARTERY DISEASE INVOLVING NATIVE CORONARY ARTERY OF NATIVE HEART WITHOUT ANGINA PECTORIS: ICD-10-CM

## 2025-05-06 DIAGNOSIS — E78.5 DYSLIPIDEMIA: ICD-10-CM

## 2025-05-06 DIAGNOSIS — I10 ESSENTIAL HYPERTENSION: ICD-10-CM

## 2025-05-06 PROCEDURE — 1159F MED LIST DOCD IN RCRD: CPT | Performed by: NURSE PRACTITIONER

## 2025-05-06 PROCEDURE — 3074F SYST BP LT 130 MM HG: CPT | Performed by: NURSE PRACTITIONER

## 2025-05-06 PROCEDURE — 93000 ELECTROCARDIOGRAM COMPLETE: CPT | Performed by: NURSE PRACTITIONER

## 2025-05-06 PROCEDURE — 4004F PT TOBACCO SCREEN RCVD TLK: CPT | Performed by: NURSE PRACTITIONER

## 2025-05-06 PROCEDURE — 1123F ACP DISCUSS/DSCN MKR DOCD: CPT | Performed by: NURSE PRACTITIONER

## 2025-05-06 PROCEDURE — 99204 OFFICE O/P NEW MOD 45 MIN: CPT | Performed by: NURSE PRACTITIONER

## 2025-05-06 PROCEDURE — 3078F DIAST BP <80 MM HG: CPT | Performed by: NURSE PRACTITIONER

## 2025-05-06 PROCEDURE — G8427 DOCREV CUR MEDS BY ELIG CLIN: HCPCS | Performed by: NURSE PRACTITIONER

## 2025-05-06 PROCEDURE — 3017F COLORECTAL CA SCREEN DOC REV: CPT | Performed by: NURSE PRACTITIONER

## 2025-05-06 PROCEDURE — G8420 CALC BMI NORM PARAMETERS: HCPCS | Performed by: NURSE PRACTITIONER

## 2025-05-06 ASSESSMENT — ENCOUNTER SYMPTOMS
SHORTNESS OF BREATH: 1
CHEST TIGHTNESS: 0
COUGH: 0
SORE THROAT: 0
WHEEZING: 0

## 2025-05-06 NOTE — PROGRESS NOTES
[facility]  [address]  [address]  [facility] [id number] [phone]      Chief Complaint / Reason for Being Seen: Chest pain, shortness of breath and fatigue    1. Other fatigue    2. Coronary artery disease involving native coronary artery of native heart without angina pectoris    3. Essential hypertension    4. Dyslipidemia    5. Abnormal electrocardiography    6. Chest pain, unspecified type    7. Shortness of breath      Patient presents to clinic today accompanied by wife.  Patient has a history of prostate cancer having had surgery, chemotherapy, radiation and hormonal shots.  He did finish with his hormonal shots in November of last year.  Does have a history of CAD having had a non-STEMI in 2019 in [place].  He did receive 2 stents.  Does have a history of hypertension, dyslipidemia and diabetes.    Patient states for about 6 months now he has been experiencing chest discomfort.  Pain is located in the center of his chest with radiation to his back between his shoulder blades.  And at times he has had left arm numbness/tingling.  Discomfort lasts 5 to 10 minutes.  He is also noted shortness of breath with activity relieved with rest.  He has noted decreased stamina and fatigue.  There has been no lightheadedness dizziness or syncope.        Subjective:    Old records have been obtained from the referring providers.  Those records have been reviewed and summarized.      Angelo Mauro is a 74 y.o. male with the following history as recorded in [facility]:  Patient Active Problem List    Diagnosis Date Noted    Prostate cancer (HCC) 12/20/2023     Current Outpatient Medications   Medication Sig Dispense Refill    acetaminophen (TYLENOL) 325 MG tablet Take 2 tablets by mouth every 6 hours as needed for Pain      empagliflozin (JARDIANCE) 25 MG tablet Take 1 tablet by mouth daily      carvedilol (COREG) 25 MG tablet Take 1 tablet by mouth 2 times daily      omeprazole (PRILOSEC) 20 MG delayed

## 2025-05-06 NOTE — PATIENT INSTRUCTIONS
[place] at the [facility] [place] located on the first floor [facility].   Enter through hospital main entrance and turn immediately to your left.    Date/Time:     Patient's contact number:  [phone] (home)     Echocardiogram -  No prep.      Takes approximately 30 min.    An echocardiogram uses sound waves to produce images of your heart. This commonly used test allows your doctor to see how your heart is beating and pumping blood. Your doctor can use the images from an echocardiogram to identify various abnormalities in the heart muscle and valves.    This test has 2 parts:   You will be asked to disrobe from the waist up and given a gown to wear. The technologist will then hook up an EKG monitor to you for the entire exam.   You will then have an ultrasound of your heart (echocardiogram) to assess the heart muscle, heart valves and heart function.     You may eat and take any medicines before the exam.     If you need to change your appointment, please call outpatient scheduling at [phone].     [place] at the [facility] located on the first [facility].   Enter through hospital main entrance and turn immediately to your left.    Patient's contact number:  [phone] (home)      Lexiscan Stress Test      Lexiscan (regadenoson injection) is a prescription drug given through an IV line that increases blood flow through the arteries of the heart during a cardiac nuclear stress test.     There are two parts to a Lexiscan stress test: the rest portion and the exercise portion. For the rest portion, a radioactive tracer is injected into your arm through the IV.  After 30 to 60 minutes, the process of imaging will begin.  A nuclear camera will be placed on your chest area and images are taken for the next 15 to 20 minutes.  For the exercise portion, a nurse will attach EKG electrodes to your chest to monitor your heart rate.

## 2025-06-02 ENCOUNTER — RESULTS FOLLOW-UP (OUTPATIENT)
Dept: CARDIOLOGY CLINIC | Age: 75
End: 2025-06-02

## 2025-06-02 ENCOUNTER — HOSPITAL ENCOUNTER (OUTPATIENT)
Age: 75
Discharge: HOME | End: 2025-06-04
Payer: MEDICARE

## 2025-06-02 ENCOUNTER — HOSPITAL ENCOUNTER (OUTPATIENT)
Dept: NUCLEAR MEDICINE | Age: 75
Discharge: HOME | End: 2025-06-04

## 2025-06-02 VITALS
BODY MASS INDEX: 24.83 KG/M2 | WEIGHT: 149 LBS | DIASTOLIC BLOOD PRESSURE: 60 MMHG | HEIGHT: 65 IN | SYSTOLIC BLOOD PRESSURE: 120 MMHG

## 2025-06-02 DIAGNOSIS — R07.9 CHEST PAIN, UNSPECIFIED TYPE: ICD-10-CM

## 2025-06-02 DIAGNOSIS — R94.31 ABNORMAL ELECTROCARDIOGRAPHY: ICD-10-CM

## 2025-06-02 DIAGNOSIS — R06.02 SHORTNESS OF BREATH: ICD-10-CM

## 2025-06-02 LAB
ECHO AO ASC DIAM: 3.4 CM
ECHO AO ASCENDING AORTA INDEX: 1.94 CM/M2
ECHO AO ROOT DIAM: 1.7 CM
ECHO AO ROOT INDEX: 0.97 CM/M2
ECHO AO SINUS VALSALVA DIAM: 2.7 CM
ECHO AO SINUS VALSALVA INDEX: 1.54 CM/M2
ECHO AO ST JNCT DIAM: 2.2 CM
ECHO AV AREA PEAK VELOCITY: 1.5 CM2
ECHO AV AREA VTI: 1.6 CM2
ECHO AV AREA/BSA PEAK VELOCITY: 0.9 CM2/M2
ECHO AV AREA/BSA VTI: 0.9 CM2/M2
ECHO AV MEAN GRADIENT: 6 MMHG
ECHO AV MEAN VELOCITY: 1.1 M/S
ECHO AV PEAK GRADIENT: 9 MMHG
ECHO AV PEAK VELOCITY: 1.5 M/S
ECHO AV VELOCITY RATIO: 0.47
ECHO AV VTI: 38.9 CM
ECHO BSA: 1.76 M2
ECHO EST RA PRESSURE: 3 MMHG
ECHO IVC PROX: 1.3 CM
ECHO LA AREA 2C: 13 CM2
ECHO LA AREA 4C: 14.2 CM2
ECHO LA DIAMETER INDEX: 1.66 CM/M2
ECHO LA DIAMETER: 2.9 CM
ECHO LA MAJOR AXIS: 4.8 CM
ECHO LA MINOR AXIS: 4.3 CM
ECHO LA TO AORTIC ROOT RATIO: 1.71
ECHO LA VOL BP: 34 ML (ref 18–58)
ECHO LA VOL MOD A2C: 31 ML (ref 18–58)
ECHO LA VOL MOD A4C: 34 ML (ref 18–58)
ECHO LA VOL/BSA BIPLANE: 19 ML/M2 (ref 16–34)
ECHO LA VOLUME INDEX MOD A2C: 18 ML/M2 (ref 16–34)
ECHO LA VOLUME INDEX MOD A4C: 19 ML/M2 (ref 16–34)
ECHO LV E' LATERAL VELOCITY: 7.4 CM/S
ECHO LV E' SEPTAL VELOCITY: 6.64 CM/S
ECHO LV EDV A2C: 65 ML
ECHO LV EDV A4C: 79 ML
ECHO LV EDV INDEX A4C: 45 ML/M2
ECHO LV EDV NDEX A2C: 37 ML/M2
ECHO LV EF PHYSICIAN: 60 %
ECHO LV EJECTION FRACTION A2C: 61 %
ECHO LV EJECTION FRACTION A4C: 61 %
ECHO LV EJECTION FRACTION BIPLANE: 60 % (ref 55–100)
ECHO LV ESV A2C: 25 ML
ECHO LV ESV A4C: 31 ML
ECHO LV ESV INDEX A2C: 14 ML/M2
ECHO LV ESV INDEX A4C: 18 ML/M2
ECHO LV FRACTIONAL SHORTENING: 31 % (ref 28–44)
ECHO LV INTERNAL DIMENSION DIASTOLE INDEX: 2 CM/M2
ECHO LV INTERNAL DIMENSION DIASTOLIC: 3.5 CM (ref 4.2–5.9)
ECHO LV INTERNAL DIMENSION SYSTOLIC INDEX: 1.37 CM/M2
ECHO LV INTERNAL DIMENSION SYSTOLIC: 2.4 CM
ECHO LV IVSD: 1 CM (ref 0.6–1)
ECHO LV MASS 2D: 119 G (ref 88–224)
ECHO LV MASS INDEX 2D: 68 G/M2 (ref 49–115)
ECHO LV POSTERIOR WALL DIASTOLIC: 1.2 CM (ref 0.6–1)
ECHO LV RELATIVE WALL THICKNESS RATIO: 0.69
ECHO LVOT AREA: 3.1 CM2
ECHO LVOT AV VTI INDEX: 0.5
ECHO LVOT DIAM: 2 CM
ECHO LVOT MEAN GRADIENT: 1 MMHG
ECHO LVOT MEAN GRADIENT: 1 MMHG
ECHO LVOT PEAK GRADIENT: 2 MMHG
ECHO LVOT PEAK VELOCITY: 0.7 M/S
ECHO LVOT STROKE VOLUME INDEX: 35.2 ML/M2
ECHO LVOT SV: 61.5 ML
ECHO LVOT VTI: 19.6 CM
ECHO MV A VELOCITY: 0.9 M/S
ECHO MV AREA VTI: 2.1 CM2
ECHO MV E DECELERATION TIME (DT): 182 MS
ECHO MV E VELOCITY: 0.48 M/S
ECHO MV E/A RATIO: 0.53
ECHO MV E/E' LATERAL: 6.49
ECHO MV E/E' RATIO (AVERAGED): 6.86
ECHO MV E/E' SEPTAL: 7.23
ECHO MV LVOT VTI INDEX: 1.47
ECHO MV MAX VELOCITY: 0.8 M/S
ECHO MV MEAN GRADIENT: 1 MMHG
ECHO MV MEAN VELOCITY: 0.6 M/S
ECHO MV PEAK GRADIENT: 3 MMHG
ECHO MV VTI: 28.8 CM
ECHO RA AREA 4C: 7.9 CM2
ECHO RA END SYSTOLIC VOLUME APICAL 4 CHAMBER INDEX BSA: 7 ML/M2
ECHO RA VOLUME: 12 ML
ECHO RV BASAL DIMENSION: 2.7 CM
ECHO RV INTERNAL DIMENSION: 3.1 CM
ECHO RV LONGITUDINAL DIMENSION: 5.7 CM
ECHO RV MID DIMENSION: 2.1 CM
ECHO RV TAPSE: 1.4 CM (ref 1.7–?)

## 2025-06-02 PROCEDURE — C8929 TTE W OR WO FOL WCON,DOPPLER: HCPCS

## 2025-06-02 PROCEDURE — 93306 TTE W/DOPPLER COMPLETE: CPT | Performed by: INTERNAL MEDICINE

## 2025-06-02 PROCEDURE — 6360000004 HC RX CONTRAST MEDICATION: Performed by: NURSE PRACTITIONER

## 2025-06-02 RX ORDER — REGADENOSON 0.08 MG/ML
0.4 INJECTION, SOLUTION INTRAVENOUS
Status: DISCONTINUED | OUTPATIENT
Start: 2025-06-02 | End: 2025-06-06 | Stop reason: HOSPADM

## 2025-06-02 RX ADMIN — SULFUR HEXAFLUORIDE 2 ML: 60.7; .19; .19 INJECTION, POWDER, LYOPHILIZED, FOR SUSPENSION INTRAVENOUS; INTRAVESICAL at 09:50

## 2025-06-06 ENCOUNTER — HOSPITAL ENCOUNTER (OUTPATIENT)
Dept: NUCLEAR MEDICINE | Age: 75
Discharge: HOME | End: 2025-06-08
Payer: MEDICARE

## 2025-06-06 LAB
NUC STRESS EJECTION FRACTION: 52 %
STRESS BASELINE DIAS BP: 64 MMHG
STRESS BASELINE HR: 64 BPM
STRESS BASELINE SYS BP: 139 MMHG
STRESS ESTIMATED WORKLOAD: 1 METS
STRESS EXERCISE DUR MIN: 5 MIN
STRESS EXERCISE DUR SEC: 0 SEC
STRESS PEAK DIAS BP: 58 MMHG
STRESS PEAK SYS BP: 151 MMHG
STRESS PERCENT HR ACHIEVED: 63 %
STRESS POST PEAK HR: 93 BPM
STRESS RATE PRESSURE PRODUCT: NORMAL BPM*MMHG
STRESS STAGE 1 BP: NORMAL MMHG
STRESS STAGE 1 COMMENTS: NORMAL
STRESS STAGE 1 HR: 62 BPM
STRESS STAGE 2 BP: NORMAL MMHG
STRESS STAGE 2 HR: 76 BPM
STRESS STAGE 3 BP: NORMAL MMHG
STRESS STAGE 3 HR: 71 BPM
STRESS STAGE 4 BP: NORMAL MMHG
STRESS STAGE 4 HR: 85 BPM
STRESS STAGE 5 BP: NORMAL MMHG
STRESS STAGE 5 HR: 71 BPM
STRESS STAGE RECOVERY 1 BP: NORMAL MMHG
STRESS STAGE RECOVERY 1 HR: 70 BPM
STRESS TARGET HR: 148 BPM

## 2025-06-06 PROCEDURE — A9502 TC99M TETROFOSMIN: HCPCS | Performed by: NURSE PRACTITIONER

## 2025-06-06 PROCEDURE — 93017 CV STRESS TEST TRACING ONLY: CPT

## 2025-06-06 PROCEDURE — 78452 HT MUSCLE IMAGE SPECT MULT: CPT | Performed by: INTERNAL MEDICINE

## 2025-06-06 PROCEDURE — 93016 CV STRESS TEST SUPVJ ONLY: CPT | Performed by: INTERNAL MEDICINE

## 2025-06-06 PROCEDURE — 93018 CV STRESS TEST I&R ONLY: CPT | Performed by: INTERNAL MEDICINE

## 2025-06-06 PROCEDURE — 6360000002 HC RX W HCPCS: Performed by: NURSE PRACTITIONER

## 2025-06-06 PROCEDURE — 3430000000 HC RX DIAGNOSTIC RADIOPHARMACEUTICAL: Performed by: NURSE PRACTITIONER

## 2025-06-06 RX ORDER — REGADENOSON 0.08 MG/ML
0.4 INJECTION, SOLUTION INTRAVENOUS
Status: COMPLETED | OUTPATIENT
Start: 2025-06-06 | End: 2025-06-06

## 2025-06-06 RX ADMIN — REGADENOSON 0.4 MG: 0.08 INJECTION, SOLUTION INTRAVENOUS at 13:09

## 2025-06-06 RX ADMIN — TETROFOSMIN 8 MILLICURIE: 0.23 INJECTION, POWDER, LYOPHILIZED, FOR SOLUTION INTRAVENOUS at 15:00

## 2025-06-06 RX ADMIN — TETROFOSMIN 24 MILLICURIE: 0.23 INJECTION, POWDER, LYOPHILIZED, FOR SOLUTION INTRAVENOUS at 15:01

## 2025-06-09 ENCOUNTER — OFFICE VISIT (OUTPATIENT)
Dept: CARDIOLOGY CLINIC | Age: 75
End: 2025-06-09
Payer: MEDICARE

## 2025-06-09 VITALS
HEIGHT: 64 IN | SYSTOLIC BLOOD PRESSURE: 110 MMHG | BODY MASS INDEX: 25.78 KG/M2 | HEART RATE: 66 BPM | WEIGHT: 151 LBS | DIASTOLIC BLOOD PRESSURE: 86 MMHG | OXYGEN SATURATION: 98 %

## 2025-06-09 DIAGNOSIS — E78.5 DYSLIPIDEMIA: ICD-10-CM

## 2025-06-09 DIAGNOSIS — I25.10 CORONARY ARTERY DISEASE INVOLVING NATIVE CORONARY ARTERY OF NATIVE HEART WITHOUT ANGINA PECTORIS: Primary | ICD-10-CM

## 2025-06-09 DIAGNOSIS — I10 ESSENTIAL HYPERTENSION: ICD-10-CM

## 2025-06-09 PROCEDURE — 3017F COLORECTAL CA SCREEN DOC REV: CPT | Performed by: NURSE PRACTITIONER

## 2025-06-09 PROCEDURE — 1159F MED LIST DOCD IN RCRD: CPT | Performed by: NURSE PRACTITIONER

## 2025-06-09 PROCEDURE — 99214 OFFICE O/P EST MOD 30 MIN: CPT | Performed by: NURSE PRACTITIONER

## 2025-06-09 PROCEDURE — 4004F PT TOBACCO SCREEN RCVD TLK: CPT | Performed by: NURSE PRACTITIONER

## 2025-06-09 PROCEDURE — G8419 CALC BMI OUT NRM PARAM NOF/U: HCPCS | Performed by: NURSE PRACTITIONER

## 2025-06-09 PROCEDURE — G8427 DOCREV CUR MEDS BY ELIG CLIN: HCPCS | Performed by: NURSE PRACTITIONER

## 2025-06-09 PROCEDURE — 3079F DIAST BP 80-89 MM HG: CPT | Performed by: NURSE PRACTITIONER

## 2025-06-09 PROCEDURE — 1123F ACP DISCUSS/DSCN MKR DOCD: CPT | Performed by: NURSE PRACTITIONER

## 2025-06-09 PROCEDURE — 3074F SYST BP LT 130 MM HG: CPT | Performed by: NURSE PRACTITIONER

## 2025-06-09 RX ORDER — ALBUTEROL SULFATE 90 UG/1
2 INHALANT RESPIRATORY (INHALATION)
COMMUNITY
Start: 2024-09-10

## 2025-06-09 ASSESSMENT — ENCOUNTER SYMPTOMS
CHEST TIGHTNESS: 0
SHORTNESS OF BREATH: 0
COUGH: 0
SORE THROAT: 0

## 2025-06-09 NOTE — PROGRESS NOTES
Blood pressure in the office today 110/86.  Patient is on Coreg 25 mg twice daily.  Lisinopril 20 mg daily. No Continue current medications:    Yes           3. Dyslipidemia Stable Patient is on Lipitor 80 mg daily. No Continue current medications:       Yes                     No orders of the defined types were placed in this encounter.    No orders of the defined types were placed in this encounter.      Discussed with patient and spouse.    Return in about 3 months (around 9/9/2025) for Follow up with Dina .    I greatly appreciate the opportunity to meet Angelo Nj and your confidence in allowing me to participate in his cardiovascular care.    DAVID Anderson - NP  6/9/2025 10:19 AM CDT                    This dictation was generated by voice recognition computer software. Although all attempts are made to edit dictation for accuracy, there may be errors in the transcription that are not intended.

## 2025-07-18 DIAGNOSIS — J30.2 SEASONAL ALLERGIES: ICD-10-CM

## 2025-07-21 RX ORDER — FLUTICASONE PROPIONATE 50 MCG
SPRAY, SUSPENSION (ML) NASAL
Qty: 16 G | Refills: 5 | Status: SHIPPED | OUTPATIENT
Start: 2025-07-21

## 2025-07-21 NOTE — TELEPHONE ENCOUNTER
Rx Refill Note  Requested Prescriptions     Pending Prescriptions Disp Refills    fluticasone (FLONASE) 50 MCG/ACT nasal spray [Pharmacy Med Name: FLUTICASONE PROPIONATE 50 SUSP] 16 g 5     Sig: USE TWO SPRAYS INTO THE NOSTRIL(S) AS DIRECTED BY PROVIDER DAILY      Last office visit with prescribing clinician: 5/2/2025   Next office visit with prescribing clinician: Visit date not found     Georgia Gabriel MA  07/21/25, 07:12 CDT

## 2025-07-23 ENCOUNTER — TELEPHONE (OUTPATIENT)
Dept: HEMATOLOGY | Age: 75
End: 2025-07-23

## 2025-07-23 NOTE — TELEPHONE ENCOUNTER
I called patient and reminded patient of their appt on 07/28/2025 and patient confirmed they would be here. I also let patient know that we have moved into our new cancer facility and asked patient if they were aware of where we were now located, and patient voiced understanding of our new location. Patient knows not to arrive any earlier their appointment because we are unable to check patients in early and to come in well hydrated incase labs are needed at any time throughout their visit.

## 2025-07-24 DIAGNOSIS — C61 PROSTATE CANCER (HCC): Primary | ICD-10-CM

## 2025-07-24 NOTE — PROGRESS NOTES
MEDICAL ONCOLOGY PROGRESS NOTE                                                          Angelo Mauro   1950 7/28/2025     Chief Complaint   Patient presents with    Follow-up     Prostate cancer (HCC)  Patient has no new issues to discuss         HISTORY OF PRESENT ILLNESS:    History of Present Illness  Reason for MD visit-toxicity monitoring/disease management  The patient returns today for a scheduled follow-up visit to monitor side effects of treatment, laboratorial monitoring, disease management and further treatment recommendations.    The patient is a 74-year-old male with a diagnosis of prostatic adenocarcinoma, initially diagnosed in 2018. He has experienced subtotal progression without obvious hypometastatic disease. He completed Eligard therapy in 01/2025 and has been monitored without any treatment since then. Enzalutamide was previously offered but declined.    He reports no new health issues since his last visit. He experiences numbness in his arm, which he believes is related to his sleeping position. Occasional neck discomfort is also mentioned. He continues to experience hot flashes, which occur more than five times daily and disrupt his sleep. He has not tried oxybutynin for this symptom. He urinates at night and rarely during the day. A remote-controlled fan is used to manage his hot flashes.    He smokes cigarettes and does not undergo annual CT scans for lung cancer screening.    SOCIAL HISTORY  The patient admits to smoking.        Diagnosis  Adenocarcinoma, prostate, Oct 2018  [place] 4+5=9  Stage IIIB     Treatment Summary  10/3/18 Robot assisted radical prostatectomy with negative (0/3) lymph node dissection by Dr Mike El[facility]  Jan 2019-Enrolled in [employer]  clinical trial  Feb 2019 Initiated Bicalutamide and Lupron. Subsequently suffered NSTEMI. Required stents.  May 2019 Completed radiation therapy  July 2019 Initiated Docetaxel  Oct 2019 Docetaxel discontinued

## 2025-07-28 ENCOUNTER — HOSPITAL ENCOUNTER (OUTPATIENT)
Dept: INFUSION THERAPY | Age: 75
Discharge: HOME | End: 2025-07-28
Payer: MEDICARE

## 2025-07-28 ENCOUNTER — OFFICE VISIT (OUTPATIENT)
Dept: HEMATOLOGY | Age: 75
End: 2025-07-28
Payer: MEDICARE

## 2025-07-28 VITALS
HEART RATE: 67 BPM | DIASTOLIC BLOOD PRESSURE: 68 MMHG | BODY MASS INDEX: 26 KG/M2 | HEIGHT: 64 IN | WEIGHT: 152.3 LBS | SYSTOLIC BLOOD PRESSURE: 122 MMHG | TEMPERATURE: 97.8 F | OXYGEN SATURATION: 96 %

## 2025-07-28 DIAGNOSIS — C61 PROSTATE CANCER (HCC): ICD-10-CM

## 2025-07-28 DIAGNOSIS — C61 PROSTATE CANCER (HCC): Primary | ICD-10-CM

## 2025-07-28 DIAGNOSIS — Z71.89 CARE PLAN DISCUSSED WITH PATIENT: ICD-10-CM

## 2025-07-28 DIAGNOSIS — R23.2 HOT FLASH IN MALE: ICD-10-CM

## 2025-07-28 DIAGNOSIS — Z87.891 PERSONAL HISTORY OF TOBACCO USE, PRESENTING HAZARDS TO HEALTH: ICD-10-CM

## 2025-07-28 LAB
ALBUMIN SERPL-MCNC: 4.2 G/DL (ref 3.5–5.2)
ALP SERPL-CCNC: 100 U/L (ref 40–129)
ALT SERPL-CCNC: 17 U/L (ref 5–41)
ANION GAP SERPL CALCULATED.3IONS-SCNC: 10 MMOL/L (ref 7–19)
AST SERPL-CCNC: 16 U/L (ref 5–40)
BASOPHILS # BLD: 0.04 K/UL (ref 0–0.2)
BASOPHILS NFR BLD: 0.6 % (ref 0–1)
BILIRUB SERPL-MCNC: 0.3 MG/DL (ref 0–1.2)
BUN SERPL-MCNC: 11 MG/DL (ref 8–23)
CALCIUM SERPL-MCNC: 9.4 MG/DL (ref 8.8–10.2)
CHLORIDE SERPL-SCNC: 100 MMOL/L (ref 98–107)
CO2 SERPL-SCNC: 25 MMOL/L (ref 22–29)
CREAT SERPL-MCNC: 0.9 MG/DL (ref 0.7–1.2)
EOSINOPHIL # BLD: 0.28 K/UL (ref 0–0.6)
EOSINOPHIL NFR BLD: 4.3 % (ref 0–5)
ERYTHROCYTE [DISTWIDTH] IN BLOOD BY AUTOMATED COUNT: 13 % (ref 11.5–14.5)
GLUCOSE SERPL-MCNC: 261 MG/DL (ref 70–99)
HCT VFR BLD AUTO: 39.5 % (ref 42–52)
HGB BLD-MCNC: 14.2 G/DL (ref 14–18)
LYMPHOCYTES # BLD: 1.36 K/UL (ref 1.1–4.5)
LYMPHOCYTES NFR BLD: 20.7 % (ref 20–40)
MCH RBC QN AUTO: 29.9 PG (ref 27–31)
MCHC RBC AUTO-ENTMCNC: 35.9 G/DL (ref 33–37)
MCV RBC AUTO: 83.2 FL (ref 80–94)
MONOCYTES # BLD: 0.46 K/UL (ref 0–0.9)
MONOCYTES NFR BLD: 7 % (ref 1–10)
NEUTROPHILS # BLD: 4.41 K/UL (ref 1.5–7.5)
NEUTS SEG NFR BLD: 67.1 % (ref 50–65)
PLATELET # BLD AUTO: 166 K/UL (ref 130–400)
PMV BLD AUTO: 9.8 FL (ref 9.4–12.4)
POTASSIUM SERPL-SCNC: 4.9 MMOL/L (ref 3.5–5.1)
PROT SERPL-MCNC: 6.9 G/DL (ref 6.4–8.3)
PSA SERPL-MCNC: 0.02 NG/ML (ref 0–4)
RBC # BLD AUTO: 4.75 M/UL (ref 4.7–6.1)
SODIUM SERPL-SCNC: 135 MMOL/L (ref 136–145)
WBC # BLD AUTO: 6.57 K/UL (ref 4.8–10.8)

## 2025-07-28 PROCEDURE — 1126F AMNT PAIN NOTED NONE PRSNT: CPT | Performed by: INTERNAL MEDICINE

## 2025-07-28 PROCEDURE — G2211 COMPLEX E/M VISIT ADD ON: HCPCS | Performed by: INTERNAL MEDICINE

## 2025-07-28 PROCEDURE — 99213 OFFICE O/P EST LOW 20 MIN: CPT

## 2025-07-28 PROCEDURE — G8427 DOCREV CUR MEDS BY ELIG CLIN: HCPCS | Performed by: INTERNAL MEDICINE

## 2025-07-28 PROCEDURE — 4004F PT TOBACCO SCREEN RCVD TLK: CPT | Performed by: INTERNAL MEDICINE

## 2025-07-28 PROCEDURE — 99214 OFFICE O/P EST MOD 30 MIN: CPT | Performed by: INTERNAL MEDICINE

## 2025-07-28 PROCEDURE — 36415 COLL VENOUS BLD VENIPUNCTURE: CPT

## 2025-07-28 PROCEDURE — 3017F COLORECTAL CA SCREEN DOC REV: CPT | Performed by: INTERNAL MEDICINE

## 2025-07-28 PROCEDURE — 84153 ASSAY OF PSA TOTAL: CPT

## 2025-07-28 PROCEDURE — 1159F MED LIST DOCD IN RCRD: CPT | Performed by: INTERNAL MEDICINE

## 2025-07-28 PROCEDURE — 1123F ACP DISCUSS/DSCN MKR DOCD: CPT | Performed by: INTERNAL MEDICINE

## 2025-07-28 PROCEDURE — G8419 CALC BMI OUT NRM PARAM NOF/U: HCPCS | Performed by: INTERNAL MEDICINE

## 2025-07-28 PROCEDURE — 85025 COMPLETE CBC W/AUTO DIFF WBC: CPT

## 2025-07-28 PROCEDURE — 80053 COMPREHEN METABOLIC PANEL: CPT

## 2025-07-28 RX ORDER — OXYBUTYNIN CHLORIDE 5 MG/1
5 TABLET ORAL 2 TIMES DAILY
Qty: 180 TABLET | Refills: 3 | Status: SHIPPED | OUTPATIENT
Start: 2025-07-28

## (undated) DEVICE — COLON KIT WITH 1.1 OZ ORCA HYDRA SEAL 2 GOWN

## (undated) DEVICE — CANNULA NSL AD L7FT DIV O2 CO2 W/ M LUERLOCK TRMPT CONN

## (undated) DEVICE — SINGLE PORT MANIFOLD: Brand: NEPTUNE 2

## (undated) DEVICE — ADAPTER CLEANING PORPOISE CLEANING

## (undated) DEVICE — CLEANING SPONGE: Brand: KOALA™

## (undated) DEVICE — SUPPLEMENT DIGESTIVE H2O SOL GI-EASE

## (undated) DEVICE — BRUSH ENDOSCP 2 END CHN HEDGEHOG